# Patient Record
Sex: FEMALE | Employment: OTHER | URBAN - NONMETROPOLITAN AREA
[De-identification: names, ages, dates, MRNs, and addresses within clinical notes are randomized per-mention and may not be internally consistent; named-entity substitution may affect disease eponyms.]

---

## 2018-08-30 ENCOUNTER — OFFICE VISIT (OUTPATIENT)
Dept: NEUROLOGY | Age: 78
End: 2018-08-30
Payer: MEDICARE

## 2018-08-30 ENCOUNTER — HOSPITAL ENCOUNTER (OUTPATIENT)
Dept: LAB | Age: 78
Setting detail: SPECIMEN
Discharge: HOME | End: 2018-08-30
Payer: MEDICARE

## 2018-08-30 VITALS
SYSTOLIC BLOOD PRESSURE: 124 MMHG | WEIGHT: 163.4 LBS | HEIGHT: 68 IN | HEART RATE: 73 BPM | OXYGEN SATURATION: 97 % | BODY MASS INDEX: 24.77 KG/M2 | DIASTOLIC BLOOD PRESSURE: 62 MMHG

## 2018-08-30 DIAGNOSIS — F41.9 ANXIETY AND DEPRESSION: ICD-10-CM

## 2018-08-30 DIAGNOSIS — Z91.81 H/O FALLING: ICD-10-CM

## 2018-08-30 DIAGNOSIS — S09.90XS MILD CLOSED HEAD INJURY, SEQUELA: ICD-10-CM

## 2018-08-30 DIAGNOSIS — M19.90 ARTHRITIS: ICD-10-CM

## 2018-08-30 DIAGNOSIS — E03.9 HYPOTHYROIDISM, UNSPECIFIED TYPE: ICD-10-CM

## 2018-08-30 DIAGNOSIS — I67.82 CHRONIC CEREBRAL ISCHEMIA: ICD-10-CM

## 2018-08-30 DIAGNOSIS — F32.A ANXIETY AND DEPRESSION: ICD-10-CM

## 2018-08-30 DIAGNOSIS — Z82.49 FAMILY HISTORY OF HEART DISEASE: ICD-10-CM

## 2018-08-30 DIAGNOSIS — G63 POLYNEUROPATHY ASSOCIATED WITH UNDERLYING DISEASE (HCC): ICD-10-CM

## 2018-08-30 DIAGNOSIS — G30.1 LATE ONSET ALZHEIMER'S DISEASE WITHOUT BEHAVIORAL DISTURBANCE (HCC): Primary | ICD-10-CM

## 2018-08-30 DIAGNOSIS — G30.1 LATE ONSET ALZHEIMER'S DISEASE WITHOUT BEHAVIORAL DISTURBANCE (HCC): ICD-10-CM

## 2018-08-30 DIAGNOSIS — R41.0 CONFUSION: ICD-10-CM

## 2018-08-30 DIAGNOSIS — E78.2 MIXED HYPERLIPIDEMIA: ICD-10-CM

## 2018-08-30 DIAGNOSIS — E08.00 DIABETES MELLITUS DUE TO UNDERLYING CONDITION WITH HYPEROSMOLARITY WITHOUT COMA, WITHOUT LONG-TERM CURRENT USE OF INSULIN (HCC): ICD-10-CM

## 2018-08-30 DIAGNOSIS — I10 ESSENTIAL HYPERTENSION: ICD-10-CM

## 2018-08-30 DIAGNOSIS — M05.731 RHEUMATOID ARTHRITIS INVOLVING BOTH WRISTS WITH POSITIVE RHEUMATOID FACTOR (HCC): ICD-10-CM

## 2018-08-30 DIAGNOSIS — I34.1 MITRAL VALVE PROLAPSE: ICD-10-CM

## 2018-08-30 DIAGNOSIS — M05.732 RHEUMATOID ARTHRITIS INVOLVING BOTH WRISTS WITH POSITIVE RHEUMATOID FACTOR (HCC): ICD-10-CM

## 2018-08-30 DIAGNOSIS — Z83.2 FAMILY HISTORY OF CLOTTING DISORDER: ICD-10-CM

## 2018-08-30 DIAGNOSIS — F02.80 LATE ONSET ALZHEIMER'S DISEASE WITHOUT BEHAVIORAL DISTURBANCE (HCC): ICD-10-CM

## 2018-08-30 DIAGNOSIS — F02.80 LATE ONSET ALZHEIMER'S DISEASE WITHOUT BEHAVIORAL DISTURBANCE (HCC): Primary | ICD-10-CM

## 2018-08-30 DIAGNOSIS — I63.19 CEREBRAL INFARCTION DUE TO EMBOLISM OF OTHER PRECEREBRAL ARTERY (HCC): ICD-10-CM

## 2018-08-30 DIAGNOSIS — I63.00 CEREBRAL INFARCTION DUE TO THROMBOSIS OF PRECEREBRAL ARTERY (HCC): ICD-10-CM

## 2018-08-30 PROBLEM — M06.9 RHEUMATOID ARTHRITIS (HCC): Status: ACTIVE | Noted: 2018-08-30

## 2018-08-30 PROBLEM — E11.9 DIABETES MELLITUS (HCC): Status: ACTIVE | Noted: 2018-08-30

## 2018-08-30 PROBLEM — S09.90XA MILD CLOSED HEAD INJURY: Status: ACTIVE | Noted: 2018-08-30

## 2018-08-30 LAB
FOLATE: >20 NG/ML
TSH SERPL DL<=0.05 MIU/L-ACNC: 2.62 MIU/L (ref 0.3–5)
VITAMIN B-12: 791 PG/ML (ref 232–1245)

## 2018-08-30 PROCEDURE — 82607 VITAMIN B-12: CPT

## 2018-08-30 PROCEDURE — 36415 COLL VENOUS BLD VENIPUNCTURE: CPT

## 2018-08-30 PROCEDURE — 84443 ASSAY THYROID STIM HORMONE: CPT

## 2018-08-30 PROCEDURE — 1090F PRES/ABSN URINE INCON ASSESS: CPT | Performed by: PSYCHIATRY & NEUROLOGY

## 2018-08-30 PROCEDURE — 99205 OFFICE O/P NEW HI 60 MIN: CPT | Performed by: PSYCHIATRY & NEUROLOGY

## 2018-08-30 PROCEDURE — G8420 CALC BMI NORM PARAMETERS: HCPCS | Performed by: PSYCHIATRY & NEUROLOGY

## 2018-08-30 PROCEDURE — G8427 DOCREV CUR MEDS BY ELIG CLIN: HCPCS | Performed by: PSYCHIATRY & NEUROLOGY

## 2018-08-30 PROCEDURE — 82746 ASSAY OF FOLIC ACID SERUM: CPT

## 2018-08-30 PROCEDURE — 1101F PT FALLS ASSESS-DOCD LE1/YR: CPT | Performed by: PSYCHIATRY & NEUROLOGY

## 2018-08-30 RX ORDER — CALCIUM POLYCARBOPHIL 625 MG 625 MG/1
625 TABLET ORAL DAILY
COMMUNITY

## 2018-08-30 RX ORDER — CHOLECALCIFEROL (VITAMIN D3) 25 MCG
CAPSULE ORAL DAILY
COMMUNITY

## 2018-08-30 RX ORDER — ESCITALOPRAM OXALATE 10 MG/1
10 TABLET ORAL DAILY
COMMUNITY

## 2018-08-30 RX ORDER — SULFAMETHOXAZOLE AND TRIMETHOPRIM 800; 160 MG/1; MG/1
1 TABLET ORAL PRN
COMMUNITY

## 2018-08-30 RX ORDER — LANOLIN ALCOHOL/MO/W.PET/CERES
1000 CREAM (GRAM) TOPICAL DAILY
COMMUNITY

## 2018-08-30 RX ORDER — SIMVASTATIN 40 MG
40 TABLET ORAL EVERY MORNING
COMMUNITY

## 2018-08-30 RX ORDER — MEMANTINE HYDROCHLORIDE 10 MG/1
10 TABLET ORAL 2 TIMES DAILY
COMMUNITY
End: 2021-01-07 | Stop reason: SDUPTHER

## 2018-08-30 RX ORDER — OMEPRAZOLE 20 MG/1
20 CAPSULE, DELAYED RELEASE ORAL 2 TIMES DAILY
COMMUNITY

## 2018-08-30 ASSESSMENT — ENCOUNTER SYMPTOMS
BACK PAIN: 0
NAUSEA: 0
VOMITING: 0
APNEA: 0
SORE THROAT: 0
EYE ITCHING: 0
SHORTNESS OF BREATH: 0
EYE PAIN: 0
COLOR CHANGE: 0
SINUS PRESSURE: 0
BLOOD IN STOOL: 0
CHEST TIGHTNESS: 0
PHOTOPHOBIA: 0
TROUBLE SWALLOWING: 0
DIARRHEA: 0
COUGH: 0
ABDOMINAL PAIN: 0
EYE REDNESS: 0
BOWEL INCONTINENCE: 0
WHEEZING: 0
CHOKING: 0
VISUAL CHANGE: 0
FACIAL SWELLING: 0
ABDOMINAL DISTENTION: 0
VOICE CHANGE: 0
EYE DISCHARGE: 0
CONSTIPATION: 0

## 2018-08-30 NOTE — PROGRESS NOTES
Carotid bruit is not present. No neck rigidity. No tracheal deviation and normal range of motion present. No Brudzinski's sign and no Kernig's sign noted. No thyroid mass and no thyromegaly present. Cardiovascular: Normal rate and regular rhythm. Pulmonary/Chest: Effort normal.   Musculoskeletal: She exhibits no edema or tenderness. Skin: Skin is warm. No rash noted. No cyanosis or erythema. No pallor. Nails show no clubbing. Psychiatric: Her mood appears anxious. Her affect is not angry, not blunt, not labile and not inappropriate. She is slowed. She is not agitated, not aggressive, not hyperactive, not withdrawn, not actively hallucinating and not combative. Thought content is not paranoid and not delusional. Cognition and memory are impaired. She does not express impulsivity or inappropriate judgment. She exhibits a depressed mood. She expresses no homicidal and no suicidal ideation. She expresses no suicidal plans and no homicidal plans. She exhibits abnormal recent memory. She exhibits normal remote memory. She is attentive. Neurologic Exam    NEUROLOGICAL EXAMINATION :      A) MENTAL STATUS:                   Alert and  oriented  To time, place  And  Person. No Aphasia. No  Dysarthria. Able   To  Follow three  Step commands without   Any  Difficulty. No right  To left confusion. Normal  Speech  And language function. Insight and  Judgment ,Fund  Of  Knowledge   DECREASED              Recent  And  Remote memory  DECREASED              Attention &Concentration are   DECREASED                                                B) CRANIAL NERVES :             2 CN : Visual  Acuity  And  Visual fields  within normal limits                      Fundi  Could  Not  Be  Could  Not  Be  Evaluated. 3,4,6 CN : Both  Pupils are   Reactive and  Equal.                          Extraocular   Movements  Are  Intact. No  Nystagmus. No  ANTON. No  Afferent  Pupillary  Defect noted. 5 CN :  Normal  Facial sensations and Corneal  Reflexes         7 CN :  Normal  Facial  Symmetry  And  Strength. No facial  Weakness. 8 CN :  Hearing  Appears   DECREASED        9, 10 CN: Normal spontaneous, reflex palate movements       11 CN:   Normal  Shoulder shrug and  strength       12 CN :   Normal  Tongue movements and  Tongue  In midline                      No tongue   Fasciculations or atrophy     C) MOTOR  EXAM:                 Strength  In upper  And  Lower extremities   within normal limits,                           EXCEPT    DECREASED   HAND               No  Drift. No  Atrophy             Rapid alternating  And  repetitions  Movements  within normal limits               Muscle  Tone  In upper  And  Lower  Extremities  normal              No rigidity. No  Spasticity. Bradykinesia   absent               No  Asterixis. Sustention  Tremor , Resting  Tremor   absent                  No other  Abnormal  Movements noted         D) SENSORY :             light touch, pinprick, position  And  Vibration  DECREASED    E) REFLEXES:                 Deep  Tendon  Reflexes   DECREASED                  No pathological  Reflexes  Bilaterally.                                 F) COORDINATION  AND  GAIT :                              Station and  Gait   SLOW                                      Romberg's test   POSITIVE                        Ataxia negative    ASSESSMENT:    Patient Active Problem List   Diagnosis    Rheumatoid arthritis ([facility] [address]    Mixed hyperlipidemia    Mitral valve prolapse    Hypothyroidism    Family history of heart disease    Family history of clotting disorder    Essential hypertension    Diabetes mellitus ([address]    Arthritis    Late onset Alzheimer's disease without behavioral disturbance    Anxiety and depression    H/O falling    Confusion    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 Alignment: The cervical vertebral bodies are in anatomic alignment. Prevertebral soft tissues: normal.     Bones: Odontoid is well seen and unremarkable. There is no evidence of fracture. Surrounding soft tissues: Normal with no evidence of mass, adenopathy, abnormal fluid collection    Lung apices: Clear. Visualized skull base: Right mastoidectomy changes and small right mastoid effusion. IMPRESSION:    1.  Moderate multilevel spondylosis and degenerative facet arthropathy throughout with disc height loss most pronounced at C5-6 and C6-7.  2.  Right mastoidectomy changes and small right mastoid effusion. WSN:[id number]      Electronically Apurva Temple MD  Signed Date/Time:  5/5/2018 8:08:00 PM      VISITING DIAGNOSIS:      ICD-10-CM ICD-9-CM    1. Late onset Alzheimer's disease without behavioral disturbance G30.1 331.0 EEG    F02.80 294.10 VL DUP CAROTID BILATERAL      US Transcranial Doppler Complete      TSH without Reflex      Vitamin B12 & Folate   2. 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 Mixed hyperlipidemia E78.2 272.2 EEG      VL DUP CAROTID BILATERAL      US Transcranial Doppler Complete      TSH without Reflex      Vitamin B12 & Folate   4. Mitral valve prolapse I34.1 424.0 EEG      VL DUP CAROTID BILATERAL      US Transcranial Doppler Complete      TSH without Reflex      Vitamin B12 & Folate   5. Hypothyroidism, unspecified type E03.9 244.9 EEG      VL DUP CAROTID BILATERAL      US Transcranial Doppler Complete      TSH without Reflex      Vitamin B12 & Folate   6.  Family history of heart disease Z82.49 V17.49 EEG      VL DUP CAROTID BILATERAL      US Transcranial Doppler Complete      TSH without Reflex      Vitamin B12 & Folate   7. Family history of clotting disorder Z83.2 V18.3 EEG      VL DUP CAROTID BILATERAL      US Transcranial Doppler Complete      TSH without Reflex      Vitamin B12 & Folate   8. Essential hypertension I10 401.9 EEG      VL DUP CAROTID BILATERAL      US Transcranial Doppler Complete      TSH without Reflex      Vitamin B12 & Folate   9. Diabetes mellitus due to underlying condition with hyperosmolarity without coma, without long-term current use of insulin ([facility]) E08.00 249.20 EEG      VL DUP CAROTID BILATERAL      US Transcranial Doppler Complete      TSH without Reflex      Vitamin B12 & Folate   10. Arthritis M19.90 716.90 EEG      VL DUP CAROTID BILATERAL      US Transcranial Doppler Complete      TSH without Reflex      Vitamin B12 & Folate   11. Anxiety and depression F41.9 300.00 EEG    F32.9 311 VL DUP CAROTID BILATERAL      US Transcranial Doppler Complete      TSH without Reflex      Vitamin B12 & Folate   12. H/O falling Z91.81 V15.88 EEG      VL DUP CAROTID BILATERAL      US Transcranial Doppler Complete      TSH without Reflex      Vitamin B12 & Folate   13. Confusion R41.0 298.9 EEG      VL DUP CAROTID BILATERAL      US Transcranial Doppler Complete      TSH without Reflex      Vitamin B12 & Folate   14. Polyneuropathy associated with underlying disease ([facility] [address] G63 357.4 EEG      VL DUP CAROTID BILATERAL      US Transcranial Doppler Complete      TSH without Reflex      Vitamin B12 & Folate   15. Mild closed head injury, sequela S09.90XS 908.9 EEG      VL DUP CAROTID BILATERAL      US Transcranial Doppler Complete      TSH without Reflex      Vitamin B12 & Folate   16. 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  Chronic cerebral ischemia I67.82 437.1 CONCERNS   &   INCREASED   RISK   FOR         * TIA,  CEREBRO  VASCULAR  ISCHEMIA, STROKE      *   COGNITIVE  &   MEMORY PROBLEMS  AND  DEMENTIAS         *   PERIPHERAL  NEUROPATHY,   NEUROPATHIC  PAIN         *  GAIT  DIFFICULTIES  &   BALANCE PROBLMES   AND  FALL            VARIOUS  RISK   FACTORS   WERE  REVIEWED   AND   DISCUSSED. *  PATIENT   HAS  MULTIPLE   MEDICAL, MENTAL HEALTH            NEUROLOGICAL   PROBLEMS . PATIENT'S   MANAGEMENT  IS  CHALLENGING. PLAN:       [place]  Of  The  Diagnoses,  The  Management & Treatment  Options           AND    Care  plan  Were        Reviewed and   Discussed   With  patient. * Goals  And  Expectations  Of  The  Therapy  Discussed   And  Reviewed. *   Benefits   And   Side  Effect  Profile  Of  Medication/s   Were   Discussed             * Need   For  Further   Follow up For  The  Various  Problems  Were discussed. * Results  Of  The  Previous  Diagnostic tests were reviewed and questions answered. patient  understand the same. Medical  Decision  Making  Was  Made  Based on the   Complexity  Of  Above  Mentioned  Diagnoses,    Data reviewed   & diagnostic  Tests  Reviewed,  Risk  Of  Significant   Co morbidities and complicating   Factors. Medical  Decision  Was   High  Complexity  Due   To  The  Patient's  Multiple  Symptoms,  Advancing   Disease,  Complex  Treatment  Regimen,  Multiple medications and   Risk  Of   Side  Effects,  Difficulty  In  Medication  Management  And  Diagnostic  Challenges   In  View  Of  The  Associated   Co  Morbid  Conditions   And  Problems. * FALL   PRECAUTIONS. THESE  REVIEWED   AND  DISCUSSED    *  USE   WALKING  ASSISTANCE  DEVICES     QUAD  CANE       *   BE  CAREFUL  WITH  ACTIVITIES   INCLUDING  DRIVING.       *   ADEQUATE   FLUID  INTAKE   AND  ELECTROLYTE  BALANCE         * KEEP  DAIRY  OF   THE  NEUROLOGICAL  SYMPTOMS fitness, but there are many ways you and your family can be more active. Anything that makes you breathe hard and gets your heart pumping is exercise. Here are some tips:  Walk to do errands or to take your child to school or the bus. Go for a family bike ride after dinner instead of watching TV. Where can you learn more? Go to https://Encoverpepiceweb.Pryv. org and sign in to your Imagekind account. Enter B589 in the [employer] box to learn more about \"A Healthy Lifestyle: Care Instructions. \"     If you do not have an account, please click on the \"Sign Up Now\" link. Current as of: July 26, 2016  Content Version: 11.2  © 5568-3132 [employer]. Care instructions adapted under license by [facility] [place] ([facility]). If you have questions about a medical condition or this instruction, always ask your healthcare professional. [address] any warranty or liability for your use of this information.

## 2018-08-30 NOTE — PATIENT INSTRUCTIONS
computer records the activity of the brain. This looks like wavy lines on the computer screen or on paper. Why is this test done? The test is often used to diagnose epilepsy. It helps a doctor know what types of seizures are happening. An EEG can also check brain activity in people with sleep disorders. It can also help a doctor know why a person passed out (lost consciousness). How can you prepare for the test?  · Tell your doctor if you are taking any medicines. Your doctor may ask you to stop taking certain medicines before the test. These include sedatives and tranquilizers, muscle relaxants, sleeping aids, and seizure medicines. · Do not eat or drink anything with caffeine in it for 12 hours before the test. This includes cola, energy drinks, and chocolate. · Shampoo your hair and rinse with clear water the evening before or the morning of the test. Do not put any hair conditioner or oil on after you wash your hair. · Your doctor may ask you not to sleep the night before the test or to sleep for only about 4 or 5 hours. This is because some types of brain activity can only be seen if you are asleep. If your doctor asks you to get less sleep than normal, plan to have someone drive you to and from the test.  What happens during the test?  · You will lie on your back on a bed or table. Or you might relax in a chair with your eyes closed. · A technologist will attach the electrodes to different places on your head. Or you might get a cap with fixed electrodes on it. · You will lie still with your eyes closed. The technologist will tell you not to talk unless you need to. · The technologist may ask you to:  ¨ Breathe deeply and rapidly. This is called hyperventilating. ¨ Look at a bright, flashing light called a strobe. ¨ Go to sleep. If you can't fall asleep, you may get medicine to help you. What else should you know about the test?  · There is no pain.  No electrical current goes through your body.  · If you have a seizure disorder such as epilepsy, the flashing lights or hyperventilation may cause a seizure. The technologist is trained to take care of you if this happens. · If electrodes are put in your nose, they may tickle. In rare cases, they can also cause some soreness or a small amount of bleeding for 1 to 2 days after the test.  How long does the test take? · The test will take about 1 to 2 hours. What happens after the test?  · You will probably be able to go home right away. But if you didn't sleep your normal amount before the test, have someone drive you home. · You can go back to your usual activities right away. When should you call for help? Watch closely for changes in your health, and be sure to contact your doctor if:  · You have any problems that you think may be from the test.  · You have any questions about the test or have not received your results. Follow-up care is a key part of your treatment and safety. Be sure to make and go to all appointments, and call your doctor if you are having problems. It's also a good idea to keep a list of the medicines you take. Ask your doctor when you can expect to have your test results. Where can you learn more? Go to https://[employer]plex Solutions.YouView. org and sign in to your AdverCar account. Enter F196 in the BlogHer box to learn more about \"Electroencephalogram (EEG): About This Test.\"     If you do not have an account, please click on the \"Sign Up Now\" link. Current as of: October 9, 2017  Content Version: 11.7  © 0570-3159 [employer]. Care instructions adapted under license by [facility] [facility] ([facility]). If you have questions about a medical condition or this instruction, always ask your healthcare professional. [place] [address] any warranty or liability for your use of this information.

## 2018-09-07 ENCOUNTER — HOSPITAL ENCOUNTER (OUTPATIENT)
Dept: INTERVENTIONAL RADIOLOGY/VASCULAR | Age: 78
Discharge: HOME | End: 2018-09-09
Payer: MEDICARE

## 2018-09-07 DIAGNOSIS — Z83.2 FAMILY HISTORY OF CLOTTING DISORDER: ICD-10-CM

## 2018-09-07 DIAGNOSIS — E03.9 HYPOTHYROIDISM, UNSPECIFIED TYPE: ICD-10-CM

## 2018-09-07 DIAGNOSIS — M19.90 ARTHRITIS: ICD-10-CM

## 2018-09-07 DIAGNOSIS — Z82.49 FAMILY HISTORY OF HEART DISEASE: ICD-10-CM

## 2018-09-07 DIAGNOSIS — I34.1 MITRAL VALVE PROLAPSE: ICD-10-CM

## 2018-09-07 DIAGNOSIS — G63 POLYNEUROPATHY ASSOCIATED WITH UNDERLYING DISEASE (HCC): ICD-10-CM

## 2018-09-07 DIAGNOSIS — M05.732 RHEUMATOID ARTHRITIS INVOLVING BOTH WRISTS WITH POSITIVE RHEUMATOID FACTOR (HCC): ICD-10-CM

## 2018-09-07 DIAGNOSIS — F32.A ANXIETY AND DEPRESSION: ICD-10-CM

## 2018-09-07 DIAGNOSIS — Z91.81 H/O FALLING: ICD-10-CM

## 2018-09-07 DIAGNOSIS — E78.2 MIXED HYPERLIPIDEMIA: ICD-10-CM

## 2018-09-07 DIAGNOSIS — R41.0 CONFUSION: ICD-10-CM

## 2018-09-07 DIAGNOSIS — F02.80 LATE ONSET ALZHEIMER'S DISEASE WITHOUT BEHAVIORAL DISTURBANCE (HCC): ICD-10-CM

## 2018-09-07 DIAGNOSIS — F41.9 ANXIETY AND DEPRESSION: ICD-10-CM

## 2018-09-07 DIAGNOSIS — M05.731 RHEUMATOID ARTHRITIS INVOLVING BOTH WRISTS WITH POSITIVE RHEUMATOID FACTOR (HCC): ICD-10-CM

## 2018-09-07 DIAGNOSIS — S09.90XS MILD CLOSED HEAD INJURY, SEQUELA: ICD-10-CM

## 2018-09-07 DIAGNOSIS — G30.1 LATE ONSET ALZHEIMER'S DISEASE WITHOUT BEHAVIORAL DISTURBANCE (HCC): ICD-10-CM

## 2018-09-07 DIAGNOSIS — E08.00 DIABETES MELLITUS DUE TO UNDERLYING CONDITION WITH HYPEROSMOLARITY WITHOUT COMA, WITHOUT LONG-TERM CURRENT USE OF INSULIN (HCC): ICD-10-CM

## 2018-09-07 DIAGNOSIS — I10 ESSENTIAL HYPERTENSION: ICD-10-CM

## 2018-09-07 DIAGNOSIS — I63.19 CEREBRAL INFARCTION DUE TO EMBOLISM OF OTHER PRECEREBRAL ARTERY (HCC): ICD-10-CM

## 2018-09-07 PROCEDURE — 93880 EXTRACRANIAL BILAT STUDY: CPT

## 2018-09-25 ENCOUNTER — HOSPITAL ENCOUNTER (OUTPATIENT)
Dept: NEUROLOGY | Age: 78
Discharge: HOME | End: 2018-09-25
Payer: MEDICARE

## 2018-09-25 DIAGNOSIS — G30.1 LATE ONSET ALZHEIMER'S DISEASE WITHOUT BEHAVIORAL DISTURBANCE (HCC): ICD-10-CM

## 2018-09-25 DIAGNOSIS — Z82.49 FAMILY HISTORY OF HEART DISEASE: ICD-10-CM

## 2018-09-25 DIAGNOSIS — M05.732 RHEUMATOID ARTHRITIS INVOLVING BOTH WRISTS WITH POSITIVE RHEUMATOID FACTOR (HCC): ICD-10-CM

## 2018-09-25 DIAGNOSIS — M05.731 RHEUMATOID ARTHRITIS INVOLVING BOTH WRISTS WITH POSITIVE RHEUMATOID FACTOR (HCC): ICD-10-CM

## 2018-09-25 DIAGNOSIS — E78.2 MIXED HYPERLIPIDEMIA: ICD-10-CM

## 2018-09-25 DIAGNOSIS — G63 POLYNEUROPATHY ASSOCIATED WITH UNDERLYING DISEASE (HCC): ICD-10-CM

## 2018-09-25 DIAGNOSIS — E08.00 DIABETES MELLITUS DUE TO UNDERLYING CONDITION WITH HYPEROSMOLARITY WITHOUT COMA, WITHOUT LONG-TERM CURRENT USE OF INSULIN (HCC): ICD-10-CM

## 2018-09-25 DIAGNOSIS — F41.9 ANXIETY AND DEPRESSION: ICD-10-CM

## 2018-09-25 DIAGNOSIS — E03.9 HYPOTHYROIDISM, UNSPECIFIED TYPE: ICD-10-CM

## 2018-09-25 DIAGNOSIS — I10 ESSENTIAL HYPERTENSION: ICD-10-CM

## 2018-09-25 DIAGNOSIS — I34.1 MITRAL VALVE PROLAPSE: ICD-10-CM

## 2018-09-25 DIAGNOSIS — S09.90XS MILD CLOSED HEAD INJURY, SEQUELA: ICD-10-CM

## 2018-09-25 DIAGNOSIS — F02.80 LATE ONSET ALZHEIMER'S DISEASE WITHOUT BEHAVIORAL DISTURBANCE (HCC): ICD-10-CM

## 2018-09-25 DIAGNOSIS — R41.0 CONFUSION: ICD-10-CM

## 2018-09-25 DIAGNOSIS — Z83.2 FAMILY HISTORY OF CLOTTING DISORDER: ICD-10-CM

## 2018-09-25 DIAGNOSIS — M19.90 ARTHRITIS: ICD-10-CM

## 2018-09-25 DIAGNOSIS — Z91.81 H/O FALLING: ICD-10-CM

## 2018-09-25 DIAGNOSIS — F32.A ANXIETY AND DEPRESSION: ICD-10-CM

## 2018-09-25 DIAGNOSIS — I63.00 CEREBRAL INFARCTION DUE TO THROMBOSIS OF PRECEREBRAL ARTERY (HCC): ICD-10-CM

## 2018-09-25 PROCEDURE — 95957 EEG DIGITAL ANALYSIS: CPT

## 2018-09-25 PROCEDURE — 93886 INTRACRANIAL COMPLETE STUDY: CPT

## 2018-09-25 PROCEDURE — 93892 TCD EMBOLI DETECT W/O INJ: CPT

## 2018-09-25 PROCEDURE — 95813 EEG EXTND MNTR 61-119 MIN: CPT

## 2018-09-25 NOTE — PROGRESS NOTES
EXTENDED EEG Completed with [facility]. PCP: Ludivina Chun DO    Ordering: Lukas Strickland.  Frida Neurologist    Interpreting Physician: Jamie Jones Neurologist    Technician: Abner Bergman RN

## 2018-09-25 NOTE — PROGRESS NOTES
TCD Completed with [facility]. PCP: Ramo Houston DO    Ordering: Bam Benitez. Frida Neurologist    Interpreting Physician: Bam Benitez.  Regan Galicia    Electronically signed by Delma Mata RN on 9/25/2018 at 1:17 PM

## 2018-10-16 ENCOUNTER — OFFICE VISIT (OUTPATIENT)
Dept: NEUROLOGY | Age: 78
End: 2018-10-16
Payer: MEDICARE

## 2018-10-16 VITALS
HEIGHT: 68 IN | BODY MASS INDEX: 24.76 KG/M2 | SYSTOLIC BLOOD PRESSURE: 124 MMHG | HEART RATE: 76 BPM | OXYGEN SATURATION: 93 % | WEIGHT: 163.36 LBS | DIASTOLIC BLOOD PRESSURE: 78 MMHG

## 2018-10-16 DIAGNOSIS — S09.90XS MILD CLOSED HEAD INJURY, SEQUELA: ICD-10-CM

## 2018-10-16 DIAGNOSIS — Z91.81 H/O FALLING: ICD-10-CM

## 2018-10-16 DIAGNOSIS — G30.1 LATE ONSET ALZHEIMER'S DISEASE WITHOUT BEHAVIORAL DISTURBANCE (HCC): Primary | ICD-10-CM

## 2018-10-16 DIAGNOSIS — E08.00 DIABETES MELLITUS DUE TO UNDERLYING CONDITION WITH HYPEROSMOLARITY WITHOUT COMA, WITHOUT LONG-TERM CURRENT USE OF INSULIN (HCC): ICD-10-CM

## 2018-10-16 DIAGNOSIS — F32.A ANXIETY AND DEPRESSION: ICD-10-CM

## 2018-10-16 DIAGNOSIS — E03.9 HYPOTHYROIDISM, UNSPECIFIED TYPE: ICD-10-CM

## 2018-10-16 DIAGNOSIS — G63 POLYNEUROPATHY ASSOCIATED WITH UNDERLYING DISEASE (HCC): ICD-10-CM

## 2018-10-16 DIAGNOSIS — F02.80 LATE ONSET ALZHEIMER'S DISEASE WITHOUT BEHAVIORAL DISTURBANCE (HCC): Primary | ICD-10-CM

## 2018-10-16 DIAGNOSIS — I63.19 CEREBRAL INFARCTION DUE TO EMBOLISM OF OTHER PRECEREBRAL ARTERY (HCC): ICD-10-CM

## 2018-10-16 DIAGNOSIS — I10 ESSENTIAL HYPERTENSION: ICD-10-CM

## 2018-10-16 DIAGNOSIS — I67.82 CHRONIC CEREBRAL ISCHEMIA: ICD-10-CM

## 2018-10-16 DIAGNOSIS — I34.1 MITRAL VALVE PROLAPSE: ICD-10-CM

## 2018-10-16 DIAGNOSIS — F41.9 ANXIETY AND DEPRESSION: ICD-10-CM

## 2018-10-16 DIAGNOSIS — Z82.49 FAMILY HISTORY OF HEART DISEASE: ICD-10-CM

## 2018-10-16 DIAGNOSIS — E78.2 MIXED HYPERLIPIDEMIA: ICD-10-CM

## 2018-10-16 PROCEDURE — 1123F ACP DISCUSS/DSCN MKR DOCD: CPT | Performed by: PSYCHIATRY & NEUROLOGY

## 2018-10-16 PROCEDURE — 99214 OFFICE O/P EST MOD 30 MIN: CPT | Performed by: PSYCHIATRY & NEUROLOGY

## 2018-10-16 PROCEDURE — G8420 CALC BMI NORM PARAMETERS: HCPCS | Performed by: PSYCHIATRY & NEUROLOGY

## 2018-10-16 PROCEDURE — 1101F PT FALLS ASSESS-DOCD LE1/YR: CPT | Performed by: PSYCHIATRY & NEUROLOGY

## 2018-10-16 PROCEDURE — G8427 DOCREV CUR MEDS BY ELIG CLIN: HCPCS | Performed by: PSYCHIATRY & NEUROLOGY

## 2018-10-16 PROCEDURE — G8484 FLU IMMUNIZE NO ADMIN: HCPCS | Performed by: PSYCHIATRY & NEUROLOGY

## 2018-10-16 PROCEDURE — 4040F PNEUMOC VAC/ADMIN/RCVD: CPT | Performed by: PSYCHIATRY & NEUROLOGY

## 2018-10-16 PROCEDURE — 1090F PRES/ABSN URINE INCON ASSESS: CPT | Performed by: PSYCHIATRY & NEUROLOGY

## 2018-10-16 PROCEDURE — 1036F TOBACCO NON-USER: CPT | Performed by: PSYCHIATRY & NEUROLOGY

## 2018-10-16 PROCEDURE — G8598 ASA/ANTIPLAT THER USED: HCPCS | Performed by: PSYCHIATRY & NEUROLOGY

## 2018-10-16 PROCEDURE — G8400 PT W/DXA NO RESULTS DOC: HCPCS | Performed by: PSYCHIATRY & NEUROLOGY

## 2018-10-16 ASSESSMENT — ENCOUNTER SYMPTOMS
EYE DISCHARGE: 0
DIARRHEA: 0
WHEEZING: 0
SORE THROAT: 0
CONSTIPATION: 0
EYE REDNESS: 0
NAUSEA: 0
COLOR CHANGE: 0
ABDOMINAL PAIN: 0
VISUAL CHANGE: 0
ABDOMINAL DISTENTION: 0
BACK PAIN: 0
PHOTOPHOBIA: 0
VOICE CHANGE: 0
BLOOD IN STOOL: 0
BOWEL INCONTINENCE: 0
EYE PAIN: 0
CHEST TIGHTNESS: 0
FACIAL SWELLING: 0
SHORTNESS OF BREATH: 0
COUGH: 0
SINUS PRESSURE: 0
EYE ITCHING: 0
TROUBLE SWALLOWING: 0
VOMITING: 0
CHOKING: 0
APNEA: 0

## 2018-10-16 NOTE — PROGRESS NOTES
AT   [address]   3  YEARS  IN  THE PAST                       4)    ON   NAMENDA      FOR   MORE   THAN     5  YEARS                     5)    H/O   BAD  DREAMS                                       ON   ARICEPT    IN   THE  PAST                                                        CURRENTLY  ON    ARICEPT  AND  NAMENDA                                         AND  TOLERATING  THE  SAME                                     AS  PER  HER                        6)   H/O     COGNITIVE  AND  MEMORY  PROBLEMS    GETTING                                 WORSE    SINCE     2017  SLOWLY                        7)     H/O    CHRONIC     BALANCE  AND  GAIT    PROBLEMS                                        -    STABLE                        8)    H/O      FALL    AND  MILD   HEAD  INJURY      IN   MAY   2018                           PATIENT  HAD  EVALUATIONS  AT  THAT  TIME. 9)    H/O   PULMONARY   EMBOLISM     MORE  THAN    5   YEARS   AGO                                 ON    XARELTO                     10)    H/O   ANXIETY,   DEPRESSION                                  -     STABLE                            ON    LEXAPRO                        11)     DIABETIC  PERIPHERAL  NEUROPATHY                                      -    STABLE                      12)    MULTIPLE  CO  MORBID   MEDICAL   CONDITIONS                               BEING  FOLLOWED     BY     HER  PCP.                                                    13)    PATIENT    HAD NEURO  DIAGNOSTIC  EVALUATIONS                                    IN   SEPT. 2018   WITH    CAROTID,   EEG      WHICH                                     DID NOT  SHOW  SIGNIFICANT  ABNORMALITIES                                TCD   ABNORMAL    AND  PATIENT  ALREADY  ON   XARELTO                                         FOR  CARDIOPULMONARY      REASONS.                            14)    PATIENT  AND  HER   DENIES  ANY  NEW  NEUROLOGICAL                                    CONCERNS.                                             PRECIPITATING  FACTORS: including  fever/infection, exertion/relaxation, position change, stress, weather change, medications/alcohol, time of day/darkness/light  Are    absent                                                             MODIFYING  FACTORS:  fever/infection, exertion/relaxation, position change, stress, weather change, medications/alcohol, time of day/darkness/light     Are  absent         Patient   Indicates   The  Presence   And  The  Absence  Of  The  Following  Associated  And   Additional  Neurological    Symptoms:                                Balance  And coordination problems  present           Gait problems     present            Headaches      absent              Migraines           absent           Memory problems        present           Confusion        present            Paresthesia numbness          present           Seizures  And  Starring  Episodes           absent           Syncope,  Near  syncopal episodes         absent           Speech problems           absent             Swallowing  Problems      absent            Dizziness,  Light headedness           absent                        Vertigo        absent             Generalized   Weakness    absent              focal  Weakness     present             Tremors         absent              Sleep  Problems     absent             History  Of   Recent   Head  Injury     absent             History  Of   Recent  TIA     absent             History  Of   Recent    Stroke     absent             Neck  Pain and  Neck muscle  Spasms  absent             Radiating  down   And   Weakness           absent            Lower back   Pain  And     Spasms  absent            Radiating    Down   And   Weakness          absent                H/O   FALLS        present               History  Of   Visual  Symptoms    absent Cholecalciferol (VITAMIN D-3) 1000 units CAPS Take by mouth daily      polycarbophil (FIBERCON) 625 MG tablet Take 625 mg by mouth daily      metFORMIN (GLUCOPHAGE) 500 MG tablet Take 500 mg by mouth 2 times daily (with meals)      Multiple Vitamins-Minerals (MULTIVITAMIN ADULT PO) Take by mouth      DiphenhydrAMINE HCl (BENADRYL ALLERGY PO) Take by mouth as needed      CIPROFLOXACIN PO Take 500 mg by mouth as needed      sulfamethoxazole-trimethoprim (BACTRIM DS;SEPTRA DS) 800-160 MG per tablet Take 1 tablet by mouth as needed       No current facility-administered medications for this visit. Allergies   Allergen Reactions    Codeine Nausea Only    Coumadin [Warfarin] Other (See Comments)     Could not stabalize    Fosamax [Alendronate] Nausea Only    Morphine Sulfate [Morphine] Nausea Only         Family History   Problem Relation Age of Onset    Diabetes Mother     Heart Disease Mother     Heart Disease Father     Cancer Sister     Cancer Brother          Social History     Social History    Marital status:      Spouse name: N/A    Number of children: N/A    Years of education: N/A     Occupational History    Not on file.      Social History Main Topics    Smoking status: Former Smoker     Quit date: 1/17/1994    Smokeless tobacco: Never Used    Alcohol use 0.6 oz/week     1 Glasses of wine per week      Comment: 1 glass occasionally    Drug use: No    Sexual activity: Yes     Partners: Male     Other Topics Concern    Not on file     Social History Narrative    No narrative on file       Vitals:    10/16/18 1426   BP: 124/78   Pulse: 76   SpO2: 93%         Wt Readings from Last 3 Encounters:   10/16/18 163 lb 5.8 oz (74.1 kg)   08/30/18 163 lb 6.4 oz (74.1 kg)         BP Readings from Last 3 Encounters:   10/16/18 124/78   08/30/18 124/62         Review of Systems   Constitutional: Negative for appetite change, chills, diaphoresis, fatigue, fever and unexpected weight within normal limits                      Fundi  Could  Not  Be  Could  Not  Be  Evaluated. 3,4,6 CN : Both  Pupils are   Reactive and  Equal.                          Extraocular   Movements  Are  Intact. No  Nystagmus. No  ANTON. No  Afferent  Pupillary  Defect noted. 5 CN :  Normal  Facial sensations and Corneal  Reflexes         7 CN :  Normal  Facial  Symmetry  And  Strength. No facial  Weakness. 8 CN :  Hearing  Appears   DECREASED        9, 10 CN: Normal spontaneous, reflex palate movements       11 CN:   Normal  Shoulder shrug and  strength       12 CN :   Normal  Tongue movements and  Tongue  In midline                      No tongue   Fasciculations or atrophy     C) MOTOR  EXAM:                 Strength  In upper  And  Lower extremities   within normal limits,                           EXCEPT    DECREASED   HAND               No  Drift. No  Atrophy             Rapid alternating  And  repetitions  Movements  within normal limits               Muscle  Tone  In upper  And  Lower  Extremities  normal              No rigidity. No  Spasticity. Bradykinesia   absent               No  Asterixis. Sustention  Tremor , Resting  Tremor   absent                  No other  Abnormal  Movements noted         D) SENSORY :             light touch, pinprick, position  And  Vibration  DECREASED    E) REFLEXES:                 Deep  Tendon  Reflexes   DECREASED                  No pathological  Reflexes  Bilaterally.                                 F) COORDINATION  AND  GAIT :                              Station and  Gait   SLOW                                      Romberg's test   POSITIVE                        Ataxia negative    ASSESSMENT:    Patient Active Problem List   Diagnosis    Rheumatoid arthritis ([address]    Mixed hyperlipidemia    Mitral valve prolapse    Hypothyroidism    Family history of heart disease    Family history of clotting disorder    Essential hypertension    Diabetes mellitus [facility] [address]    Arthritis    Late onset Alzheimer's disease without behavioral disturbance    Anxiety and depression    H/O falling    Confusion    Polyneuropathy associated with underlying disease ([facility] [address] [facility]    Mild closed head injury    Chronic cerebral ischemia    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       HISTORY:   ORDERING SYSTEM PROVIDED HISTORY: Cerebral infarction due to embolism of   other precerebral artery (HCC)       FINDINGS:       RIGHT:       The right common carotid artery demonstrates peak systolic velocities of 56,   49 cm/sec in the proximal and distal segments respectively.       The right internal carotid artery demonstrates the systolic velocities of 46,   71, 67 cm/sec in the proximal, mid and distal segments respectively.       The external carotid artery is patent.  The vertebral artery demonstrates   normal antegrade flow.       Scattered atherosclerosis.       ICA/CCA ratio of 0.9.           LEFT:       The left common carotid artery demonstrates peak systolic velocities of 66,   59 cm/sec in the proximal and distal segments respectively.       The left internal carotid artery demonstrates the systolic velocities of 43,   87, 82 cm/sec in the proximal, mid and distal segments respectively.       The external carotid artery is patent.  The vertebral artery demonstrates   normal antegrade flow.       Scattered atherosclerosis       ICA/CCA ratio of 0.9.           Impression   The right internal carotid artery demonstrates 0-50% stenosis .       The left internal carotid artery demonstrates 0-50% stenosis .       Bilateral vertebral arteries are patent with flow in the normal direction.               [address], [place]      Ordering physician: Austyn Torres

## 2019-05-13 ENCOUNTER — OFFICE VISIT (OUTPATIENT)
Dept: NEUROLOGY | Age: 79
End: 2019-05-13
Payer: MEDICARE

## 2019-05-13 VITALS
BODY MASS INDEX: 27.82 KG/M2 | WEIGHT: 167 LBS | HEIGHT: 65 IN | DIASTOLIC BLOOD PRESSURE: 80 MMHG | SYSTOLIC BLOOD PRESSURE: 132 MMHG | HEART RATE: 76 BPM

## 2019-05-13 DIAGNOSIS — G30.1 LATE ONSET ALZHEIMER'S DISEASE WITHOUT BEHAVIORAL DISTURBANCE (HCC): Primary | ICD-10-CM

## 2019-05-13 DIAGNOSIS — R41.0 CONFUSION: ICD-10-CM

## 2019-05-13 DIAGNOSIS — G63 POLYNEUROPATHY ASSOCIATED WITH UNDERLYING DISEASE (HCC): ICD-10-CM

## 2019-05-13 DIAGNOSIS — Z82.49 FAMILY HISTORY OF HEART DISEASE: ICD-10-CM

## 2019-05-13 DIAGNOSIS — E03.9 HYPOTHYROIDISM, UNSPECIFIED TYPE: ICD-10-CM

## 2019-05-13 DIAGNOSIS — M05.731 RHEUMATOID ARTHRITIS INVOLVING BOTH WRISTS WITH POSITIVE RHEUMATOID FACTOR (HCC): ICD-10-CM

## 2019-05-13 DIAGNOSIS — S09.90XS MILD CLOSED HEAD INJURY, SEQUELA: ICD-10-CM

## 2019-05-13 DIAGNOSIS — I67.82 CHRONIC CEREBRAL ISCHEMIA: ICD-10-CM

## 2019-05-13 DIAGNOSIS — I10 ESSENTIAL HYPERTENSION: ICD-10-CM

## 2019-05-13 DIAGNOSIS — Z91.81 H/O FALLING: ICD-10-CM

## 2019-05-13 DIAGNOSIS — M05.732 RHEUMATOID ARTHRITIS INVOLVING BOTH WRISTS WITH POSITIVE RHEUMATOID FACTOR (HCC): ICD-10-CM

## 2019-05-13 DIAGNOSIS — E08.00 DIABETES MELLITUS DUE TO UNDERLYING CONDITION WITH HYPEROSMOLARITY WITHOUT COMA, WITHOUT LONG-TERM CURRENT USE OF INSULIN (HCC): ICD-10-CM

## 2019-05-13 DIAGNOSIS — I63.19 CEREBRAL INFARCTION DUE TO EMBOLISM OF OTHER PRECEREBRAL ARTERY (HCC): ICD-10-CM

## 2019-05-13 DIAGNOSIS — F02.80 LATE ONSET ALZHEIMER'S DISEASE WITHOUT BEHAVIORAL DISTURBANCE (HCC): Primary | ICD-10-CM

## 2019-05-13 DIAGNOSIS — I63.00 CEREBRAL INFARCTION DUE TO THROMBOSIS OF PRECEREBRAL ARTERY (HCC): ICD-10-CM

## 2019-05-13 DIAGNOSIS — E78.2 MIXED HYPERLIPIDEMIA: ICD-10-CM

## 2019-05-13 PROCEDURE — 1090F PRES/ABSN URINE INCON ASSESS: CPT | Performed by: PSYCHIATRY & NEUROLOGY

## 2019-05-13 PROCEDURE — G8400 PT W/DXA NO RESULTS DOC: HCPCS | Performed by: PSYCHIATRY & NEUROLOGY

## 2019-05-13 PROCEDURE — 4040F PNEUMOC VAC/ADMIN/RCVD: CPT | Performed by: PSYCHIATRY & NEUROLOGY

## 2019-05-13 PROCEDURE — G8419 CALC BMI OUT NRM PARAM NOF/U: HCPCS | Performed by: PSYCHIATRY & NEUROLOGY

## 2019-05-13 PROCEDURE — G8427 DOCREV CUR MEDS BY ELIG CLIN: HCPCS | Performed by: PSYCHIATRY & NEUROLOGY

## 2019-05-13 PROCEDURE — G8598 ASA/ANTIPLAT THER USED: HCPCS | Performed by: PSYCHIATRY & NEUROLOGY

## 2019-05-13 PROCEDURE — 1036F TOBACCO NON-USER: CPT | Performed by: PSYCHIATRY & NEUROLOGY

## 2019-05-13 PROCEDURE — 99215 OFFICE O/P EST HI 40 MIN: CPT | Performed by: PSYCHIATRY & NEUROLOGY

## 2019-05-13 PROCEDURE — 1123F ACP DISCUSS/DSCN MKR DOCD: CPT | Performed by: PSYCHIATRY & NEUROLOGY

## 2019-05-13 RX ORDER — DONEPEZIL HYDROCHLORIDE 5 MG/1
TABLET, FILM COATED ORAL
COMMUNITY
Start: 2019-04-23 | End: 2022-09-09 | Stop reason: SDUPTHER

## 2019-05-13 RX ORDER — LEVOTHYROXINE SODIUM 0.07 MG/1
75 TABLET ORAL
COMMUNITY

## 2019-05-13 ASSESSMENT — ENCOUNTER SYMPTOMS
VISUAL CHANGE: 0
EYE REDNESS: 0
EYE ITCHING: 0
PHOTOPHOBIA: 0
CHEST TIGHTNESS: 0
FACIAL SWELLING: 0
SORE THROAT: 0
ABDOMINAL DISTENTION: 0
SINUS PRESSURE: 0
CONSTIPATION: 0
ABDOMINAL PAIN: 0
VOMITING: 0
TROUBLE SWALLOWING: 0
DIARRHEA: 0
VOICE CHANGE: 0
EYE PAIN: 0
COUGH: 0
SHORTNESS OF BREATH: 0
CHOKING: 0
NAUSEA: 0
BACK PAIN: 0
APNEA: 0
WHEEZING: 0
BLOOD IN STOOL: 0
COLOR CHANGE: 0
BOWEL INCONTINENCE: 0
EYE DISCHARGE: 0

## 2019-05-13 NOTE — PATIENT INSTRUCTIONS
* FALL   PRECAUTIONS. *   ADEQUATE   FLUID  INTAKE   AND  ELECTROLYTE  BALANCE           * KEEP  DAIRY  OF   THE  NEUROLOGICAL  SYMPTOMS        *  TO  MAINTAIN  REGULAR  SLEEP  WAKE  CYCLES. *   TO  HAVE  ADEQUATE  REST  AND   SLEEP    HOURS.    *    AVOID  USAGE OF            TOBACCO,  EXCESSIVE  ALCOHOL  AND   ILLEGAL   SUBSTANCES,  IF  ANY        *  Maintain   Healthy  Life Style    With   Periodic  Monitoring  Of    Any  Medical  Conditions  Including   Elevated  Blood  Pressure,  Lipid  Profile,   Blood  Sugar levels  And   Heart  Disease. *   Period   Screening  For  Cancers  Involving  Breast,  Colon,   lungs  And  Other  Organs  As  Applicable,  In consultation   With  Your  Primary Care Providers. *  If   There is  Any  Significant  Worsening   Of  Current  Symptoms  And  Or  If    Any additional  New  Neurological  Symptoms  Or  Significant  Concerns   Should  Call  [phone] or  Go  To  Emergency  Department  For  Further  Immediate  Evaluation.

## 2019-05-13 NOTE — PROGRESS NOTES
[facility]  Neurology  [address] [place] [address]  Phone: [phone]   Fax: [phone]    SUBJECTIVE:     PATIENT ID:  Jose Yun is a  RIGHT HANDED [de-identified] y.o. female. Neurologic Problem   The patient's primary symptoms include clumsiness, focal sensory loss, a loss of balance, memory loss and weakness. The patient's pertinent negatives include no altered mental status, focal weakness, near-syncope, slurred speech, syncope or visual change. This is a chronic problem. Episode onset: MORE  THAN   5  YEARS. The neurological problem developed insidiously. The problem has been gradually worsening since onset. There was left-sided, lower extremity and right-sided focality noted. Pertinent negatives include no abdominal pain, auditory change, aura, back pain, bladder incontinence, bowel incontinence, chest pain, confusion, diaphoresis, dizziness, fatigue, fever, headaches, light-headedness, nausea, neck pain, palpitations, shortness of breath, vertigo or vomiting. Past treatments include medication. The treatment provided mild relief. Her past medical history is significant for dementia, head trauma and mood changes. There is no history of a bleeding disorder, a clotting disorder, a CVA, liver disease or seizures. History obtained from  The patient     AND  HER       and other  available medical records were  Also  reviewed. The  Duration,  Quality,  Severity,  Location,  Timing,  Context,  Modifying  Factors   Of   The   Chief   Complaint   And  Present  Illness   Was   Reviewed   In   Chronological   Manner.                                         CURRENT PATIENT'S  MAIN  CONCERNS INCLUDE :                       1)  CHRONIC    MEMORY  PROBLEMS   AND   DEMENTIA                                    MORE  THAN     10   YEARS                       2)  FAMILY    H/O    DEMENTIA                           3)     H/O    BEING   ENROLLED  IN   DEMENTIA   RESEARCH  STUDY PATIENT     INDICATED     SHE IS  OPTIMISTIC                                              AND     SELF  CARE  WITH    DAILY  ACTIVITIES OF  LIVING. PATIENT'S   SPOUSE IS  HELPING                                                         15)     VARIOUS  RISK   FACTORS   WERE  REVIEWED   AND   DISCUSSED. PATIENT   HAS  MULTIPLE   MEDICAL, MENTAL HEALTH                                                NEUROLOGICAL   PROBLEMS . PATIENT'S   MANAGEMENT  IS  CHALLENGING.                                16)         PATIENT  AND  HER    DENIES  ANY  NEW  NEUROLOGICAL                                    CONCERNS.                                             PRECIPITATING  FACTORS: including  fever/infection, exertion/relaxation, position change, stress, weather change, medications/alcohol, time of day/darkness/light  Are    absent                                                             MODIFYING  FACTORS:  fever/infection, exertion/relaxation, position change, stress, weather change, medications/alcohol, time of day/darkness/light     Are  absent         Patient   Indicates   The  Presence   And  The  Absence  Of  The  Following  Associated  And   Additional  Neurological    Symptoms:                                Balance  And coordination problems  present           Gait problems     present            Headaches      absent              Migraines           absent           Memory problems        present           Confusion        present            Paresthesia numbness          present           Seizures  And  Starring  Episodes           absent           Syncope,  Near  syncopal episodes         absent           Speech problems           absent             Swallowing  Problems      absent            Dizziness,  Light headedness           absent                        Vertigo        absent Generalized   Weakness    absent              focal  Weakness     present             Tremors         absent              Sleep  Problems     absent             History  Of   Recent   Head  Injury     absent             History  Of   Recent  TIA     absent             History  Of   Recent    Stroke     absent             Neck  Pain and  Neck muscle  Spasms  absent             Radiating  down   And   Weakness           absent            Lower back   Pain  And     Spasms  absent            Radiating    Down   And   Weakness          absent                H/O   FALLS        present               History  Of   Visual  Symptoms    absent                Associated   Diplopia       absent                                Also   Additional   Symptoms   Present    As  Documented    In   The detailed    Review  Of  Systems   And    Please   Refer   To    Them for   Additional  Information. Any components  That are either  Unobtainable  Or  Limited  In   HPI, ROS  And/or PFSH   Are   Due   To   Patient's  Medical  Problems,  Clinical  Condition and/or lack of other  Alternate resources.           RECORDS   REVIEWED:  historical medical records     INFORMATION   REVIEWED:     MEDICAL   HISTORY,     SURGICAL   HISTORY,   MEDICATIONS   LIST,   ALLERGIES AND  DRUG  INTOLERANCES,     FAMILY   HISTORY,  SOCIAL  HISTORY,    PROBLEM  LIST   FOR  PATIENT  CARE   COORDINATION    Past Medical History:   Diagnosis Date    Arthritis     Diabetes mellitus ([address]     Essential hypertension     Family history of clotting disorder     Family history of heart disease     History of pulmonary embolism     History of total hip replacement     Hypothyroidism     Mild cognitive impairment     Mitral valve prolapse     Mixed hyperlipidemia     Rheumatoid arthritis ([address]          Past Surgical History:   Procedure Laterality Date    CERVICAL SPINE SURGERY      HYSTERECTOMY      JOINT REPLACEMENT      MASTOIDECTOMY Right     THYROIDECTOMY      TYMPANOPLASTY Left          Current Outpatient Medications   Medication Sig Dispense Refill    donepezil (ARICEPT) 5 MG tablet TAKE 1 TABLET DAILY      rivaroxaban (XARELTO) 20 MG TABS tablet Take 20 mg by mouth daily (with breakfast)      ATENOLOL PO Take 75 mg by mouth Take 1 1/2 pills in am      omeprazole (PRILOSEC) 20 MG delayed release capsule Take 20 mg by mouth 2 times daily      simvastatin (ZOCOR) 40 MG tablet Take 40 mg by mouth every morning      escitalopram (LEXAPRO) 10 MG tablet Take 10 mg by mouth daily      memantine (NAMENDA) 10 MG tablet Take 10 mg by mouth daily      vitamin B-12 (CYANOCOBALAMIN) 1000 MCG tablet Take 1,000 mcg by mouth daily      Cholecalciferol (VITAMIN D-3) 1000 units CAPS Take by mouth daily      polycarbophil (FIBERCON) 625 MG tablet Take 625 mg by mouth daily      metFORMIN (GLUCOPHAGE) 500 MG tablet Take 500 mg by mouth 2 times daily (with meals)      Multiple Vitamins-Minerals (MULTIVITAMIN ADULT PO) Take by mouth      DiphenhydrAMINE HCl (BENADRYL ALLERGY PO) Take by mouth as needed      CIPROFLOXACIN PO Take 500 mg by mouth as needed      sulfamethoxazole-trimethoprim (BACTRIM DS;SEPTRA DS) 800-160 MG per tablet Take 1 tablet by mouth as needed      levothyroxine (SYNTHROID) 75 MCG tablet Take 75 mcg by mouth       No current facility-administered medications for this visit.           Allergies   Allergen Reactions    Codeine Nausea Only    Coumadin [Warfarin] Other (See Comments)     Could not stabalize    Fosamax [Alendronate] Nausea Only    Mercuric Bichloride Hives    Morphine Sulfate [Morphine] Nausea Only         Family History   Problem Relation Age of Onset    Diabetes Mother     Heart Disease Mother     Heart Disease Father     Cancer Sister     Cancer Brother          Social History     Socioeconomic History    Marital status:      Spouse name: Not on file    Number of children: Not on file   [facility] Years of education: Not on file    Highest education level: Not on file   Occupational History    Not on file   Social Needs    Financial resource strain: Not on file    Food insecurity:     Worry: Not on file     Inability: Not on file    Transportation needs:     Medical: Not on file     Non-medical: Not on file   Tobacco Use    Smoking status: Former Smoker     Last attempt to quit: 1994     Years since quittin.3    Smokeless tobacco: Never Used   Substance and Sexual Activity    Alcohol use: Yes     Alcohol/week: 0.6 oz     Types: 1 Glasses of wine per week     Comment: 1 glass occasionally    Drug use: No    Sexual activity: Yes     Partners: Male   Lifestyle    Physical activity:     Days per week: Not on file     Minutes per session: Not on file    Stress: Not on file   Relationships    Social connections:     Talks on phone: Not on file     Gets together: Not on file     Attends [employer] service: Not on file     Active member of club or organization: Not on file     Attends meetings of clubs or organizations: Not on file     Relationship status: Not on file    Intimate partner violence:     Fear of current or ex partner: Not on file     Emotionally abused: Not on file     Physically abused: Not on file     Forced sexual activity: Not on file   Other Topics Concern    Not on file   Social History Narrative    Not on file       Vitals:    19 1534   BP: 132/80   Pulse: 76         Wt Readings from Last 3 Encounters:   19 167 lb (75.8 kg)   10/16/18 163 lb 5.8 oz (74.1 kg)   18 163 lb 6.4 oz (74.1 kg)         BP Readings from Last 3 Encounters:   19 132/80   10/16/18 124/78   18 124/62         Review of Systems   Constitutional: Negative for appetite change, chills, diaphoresis, fatigue, fever and unexpected weight change.    HENT: Negative for congestion, dental problem, drooling, ear discharge, ear pain, facial swelling, hearing loss, mouth sores, nosebleeds, postnasal drip, sinus pressure, sore throat, tinnitus, trouble swallowing and voice change. Eyes: Negative for photophobia, pain, discharge, redness, itching and visual disturbance. Respiratory: Negative for apnea, cough, choking, chest tightness, shortness of breath and wheezing. Cardiovascular: Negative for chest pain, palpitations, leg swelling and near-syncope. Gastrointestinal: Negative for abdominal distention, abdominal pain, blood in stool, bowel incontinence, constipation, diarrhea, nausea and vomiting. Endocrine: Negative for cold intolerance, heat intolerance, polydipsia, polyphagia and polyuria. Genitourinary: Negative for bladder incontinence. Musculoskeletal: Positive for gait problem. Negative for arthralgias, back pain, joint swelling, myalgias, neck pain and neck stiffness. Skin: Negative for color change, pallor, rash and wound. Allergic/Immunologic: Negative for environmental allergies, food allergies and immunocompromised state. Neurological: Positive for weakness, numbness and loss of balance. Negative for dizziness, vertigo, tremors, focal weakness, seizures, syncope, facial asymmetry, speech difficulty, light-headedness and headaches. Hematological: Negative for adenopathy. Does not bruise/bleed easily. Psychiatric/Behavioral: Positive for decreased concentration and memory loss. Negative for agitation, behavioral problems, confusion, dysphoric mood, hallucinations, self-injury, sleep disturbance and suicidal ideas. The patient is nervous/anxious. The patient is not hyperactive. OBJECTIVE:    Physical Exam   Constitutional: She appears well-developed and well-nourished. She is cooperative. HENT:   Head: Normocephalic and atraumatic. Head is without raccoon's eyes and without Whitaker's sign.    Right Ear: External ear normal.   Left Ear: External ear normal.   Nose: Nose normal.   Mouth/Throat: Oropharynx is clear and moist.   Eyes: Conjunctivae are Equal.                          Extraocular   Movements  Are  Intact. No  Nystagmus. No  ANTON. No  Afferent  Pupillary  Defect noted. 5 CN :  Normal  Facial sensations and Corneal  Reflexes         7 CN :  Normal  Facial  Symmetry  And  Strength. No facial  Weakness. 8 CN :  Hearing  Appears   DECREASED        9, 10 CN: Normal spontaneous, reflex palate movements       11 CN:   Normal  Shoulder shrug and  strength       12 CN :   Normal  Tongue movements and  Tongue  In midline                      No tongue   Fasciculations or atrophy     C) MOTOR  EXAM:                 Strength  In upper  And  Lower extremities   within normal limits,                           EXCEPT    DECREASED   HAND               No  Drift. No  Atrophy             Rapid alternating  And  repetitions  Movements  within normal limits               Muscle  Tone  In upper  And  Lower  Extremities  normal              No rigidity. No  Spasticity. Bradykinesia   absent               No  Asterixis. Sustention  Tremor , Resting  Tremor   absent                  No other  Abnormal  Movements noted         D) SENSORY :             light touch, pinprick, position  And  Vibration  DECREASED    E) REFLEXES:                 Deep  Tendon  Reflexes   DECREASED                  No pathological  Reflexes  Bilaterally.                                 F) COORDINATION  AND  GAIT :                              Station and  Gait   SLOW                                      Romberg's test   POSITIVE                        Ataxia negative    ASSESSMENT:    Patient Active Problem List   Diagnosis    Rhe[address] [address]    Mixed hyperlipidemia    Mitral valve prolapse    Hypothyroidism    Family history of heart disease    Family history of clotting disorder    Essential hypertension    Diabetes mellitus ([address]    Arthritis    Late onset Alzheimer's disease without behavioral disturbance    Anxiety and depression    H/O falling    Confusion    Polyneuropathy associated with underlying disease ([address]    Mild closed head injury    Chronic cerebral ischemia    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       HISTORY:   ORDERING SYSTEM PROVIDED HISTORY: Cerebral infarction due to embolism of   other precerebral artery (HCC)       FINDINGS:       RIGHT:       The right common carotid artery demonstrates peak systolic velocities of 56,   49 cm/sec in the proximal and distal segments respectively.       The right internal carotid artery demonstrates the systolic velocities of 46,   71, 67 cm/sec in the proximal, mid and distal segments respectively.       The external carotid artery is patent.  The vertebral artery demonstrates   normal antegrade flow.       Scattered atherosclerosis.       ICA/CCA ratio of 0.9.           LEFT:       The left common carotid artery demonstrates peak systolic velocities of 66,   59 cm/sec in the proximal and distal segments respectively.       The left internal carotid artery demonstrates the systolic velocities of 43,   87, 82 cm/sec in the proximal, mid and distal segments respectively.       The external carotid artery is patent.  The vertebral artery demonstrates   normal antegrade flow.       Scattered atherosclerosis       ICA/CCA ratio of 0.9.           Impression   The right internal carotid artery demonstrates 0-50% stenosis .       The left internal carotid artery demonstrates 0-50% stenosis .       Bilateral vertebral arteries are patent with flow in the normal direction.               [address], [place]      Ordering physician: Gabi Jaquez    EXAMINATION: CT HEAD W/O CONTRAST  Date: 5/5/2018 7:37 PM  History: Female, 76years old.  Ground level fall hit face and head, on blood thinners     COMPARISON:  None. Technique: Noncontrast imaging of the head. FINDINGS:  Intracranial: No mass effect or midline shift. No CT evidence of acute ischemia or infarction. No abnormal extra-axial collections. No acute intracranial hemorrhage. Negative for hydrocephalus. The basilar cisterns and foramen magnum are preserved. Atrophy/white matter: Age appropriate cerebral volume and white matter. Soft tissues and scalp: There is a right supraorbital scalp hematoma. It measures 44 mm in diameter by 12 mm in thickness. Visualized orbits: Unremarkable. Sinuses and mastoids: Clear sinuses. There has been previous right mastoidectomy. Left mastoid air cells are unremarkable. Bones: The bony calvarium is intact. Nondisplaced nasal fractures are seen. Additional comments: None. IMPRESSION:     1.  No evidence of acute intracranial pathology. 2.  Nondisplaced nasal fractures  3.  Right supraorbital scalp hematoma  4.  Status post right mastoidectomy  [address] [facility]      Ordering physician: Seema Sherman    EXAMINATION: CT CERVICAL SPINE W/O CONTRAST    DATE OF EXAM: 5/5/2018 7:38 PM   CLINICAL INFORMATION: Ground level fall head Head    COMPARISON: None. TECHNIQUE: Multi-detector volumetric/helical data set was obtained through the cervical spine without contrast enhancement and viewed in bone windows. Additional sagittal and coronal reconstructions were also performed. FINDINGS:     Discs: Moderate multilevel spondylosis and degenerative facet arthropathy throughout with disc height loss most pronounced at C5-6 and C6-7. Alignment: The cervical vertebral bodies are in anatomic alignment. Prevertebral soft tissues: normal.     Bones: Odontoid is well seen and unremarkable. There is no evidence of fracture.     Surrounding soft tissues: Normal with no evidence of mass, adenopathy, abnormal fluid collection    Lung apices: Reviewed and   Discussed   With  patient. * Goals  And  Expectations  Of  The  Therapy  Discussed   And  Reviewed. *   Benefits   And   Side  Effect  Profile  Of  Medication/s   Were   Discussed             * Need   For  Further   Follow up For  The  Various  Problems  Were discussed. * Results  Of  The  Previous  Diagnostic tests were reviewed and questions answered. patient  understand the same. Medical  Decision  Making  Was  Made  Based on the   Complexity  Of  Above  Mentioned  Diagnoses,    Data reviewed   & diagnostic  Tests  Reviewed,  Risk  Of  Significant   Co morbidities and complicating   Factors. Medical  Decision  Was   High  Complexity  Due   To  The  Patient's  Multiple  Symptoms,  Advancing   Disease,  Complex  Treatment  Regimen,  Multiple medications and   Risk  Of   Side  Effects,  Difficulty  In  Medication  Management  And  Diagnostic  Challenges   In  View  Of  The  Associated   Co  Morbid  Conditions   And  Problems. * FALL   PRECAUTIONS. THESE  REVIEWED   AND  DISCUSSED    *  USE   WALKING  ASSISTANCE  DEVICES     QUAD  CANE       *   BE  CAREFUL  WITH  ACTIVITIES   INCLUDING  DRIVING. *   ADEQUATE   FLUID  INTAKE   AND  ELECTROLYTE  BALANCE         * KEEP  DAIRY  OF   THE  NEUROLOGICAL  SYMPTOMS        RECORDING THE    DURATION  AND  FREQUENCY. *  AVOID    CONDITIONS  AND  FACTORS   THAT  MAKE                  NEUROLOGICAL  SYMPTOMS  WORSE. *  TO  MAINTAIN  REGULAR  SLEEP  WAKE  CYCLES.      *   TO  HAVE  ADEQUATE  REST  AND   SLEEP    HOURS.          *    AVOID  ANY USAGE OF                 TOBACCO,  EXCESSIVE  ALCOHOL  AND   ILLEGAL   SUBSTANCES      *  CONTINUE MEDICATIONS PRESCRIBED BY NEUROLOGIST AS    RECOMMENDED     *   Compliance   With  Medications   And  Instructions      *  May   Use  Pill  Box,    If  Needed      *    Prophylactic  Use   Of     Vitamin   B   Complex,  Folic  Acid, Develops   Any additional  New  Neurological  Symptoms  Or  Significant  Concerns   Should  Call  911 or  Go  To  Emergency  Department  For  Further  Immediate  Evaluation. *   The  Neurological   Findings,  Possible  Diagnosis,  Differential diagnoses   And  Options  For    Further   Investigations   And  management   Are  Discussed  Comprehensively. Medications   And  Prescription   Risks  And  Side effects  Are   Also  Discussed. The  Above  Were  Reviewed  With  patient and  and   questions  Answered  In  Detail. More   Than   50% of face  To face Time   Was  Spent  On  Counseling   And   Coordination  Of  Care   Of   Patient's multiple   Neurological  Problems   And   Comorbid  Medical   Conditions. Electronically signed by Mitzy Hardin MD   Board Certified in  Neurology &  In  [facility] ([facility])      DISCLAIMER:   Although every effort was made to ensure the accuracy of this  electronic transcription, some errors in transcription may have occurred. GENERAL PATIENT INSTRUCTIONS:     A Healthy Lifestyle: Care Instructions  Your Care Instructions  A healthy lifestyle can help you feel good, stay at a healthy weight, and have plenty of energy for both work and play. A healthy lifestyle is something you can share with your whole family. A healthy lifestyle also can lower your risk for serious health problems, such as high blood pressure, heart disease, and diabetes. You can follow a few steps listed below to improve your health and the health of your family. Follow-up care is a key part of your treatment and safety. Be sure to make and go to all appointments, and call your doctor if you are having problems. Its also a good idea to know your test results and keep a list of the medicines you take. How can you care for yourself at home? Do not eat too much sugar, fat, or fast foods.  You after you quit. Limit how much alcohol you drink. Moderate amounts of alcohol (up to 2 drinks a day for men, 1 drink a day for women) are okay. But drinking too much can lead to liver problems, high blood pressure, and other health problems. Family health  If you have a family, there are many things you can do together to improve your health. Eat meals together as a family as often as possible. Eat healthy foods. This includes fruits, vegetables, lean meats and dairy, and whole grains. Include your family in your fitness plan. Most people think of activities such as jogging or tennis as the way to fitness, but there are many ways you and your family can be more active. Anything that makes you breathe hard and gets your heart pumping is exercise. Here are some tips:  Walk to do errands or to take your child to school or the bus. Go for a family bike ride after dinner instead of watching TV. Where can you learn more? Go to https://Ember Entertainmentpe1006.tv.healthAtritech. org and sign in to your [employer] account. Enter U444 in the TrackR box to learn more about \"A Healthy Lifestyle: Care Instructions. \"     If you do not have an account, please click on the \"Sign Up Now\" link. Current as of: July 26, 2016  Content Version: 11.2  © 4386-3019 [employer]. Care instructions adapted under license by [facility] ([facility]). If you have questions about a medical condition or this instruction, always ask your healthcare professional. [place] [address] any warranty or liability for your use of this information.

## 2019-11-14 ENCOUNTER — OFFICE VISIT (OUTPATIENT)
Dept: NEUROLOGY | Age: 79
End: 2019-11-14
Payer: MEDICARE

## 2019-11-14 VITALS
WEIGHT: 166.8 LBS | RESPIRATION RATE: 16 BRPM | HEIGHT: 64 IN | HEART RATE: 72 BPM | SYSTOLIC BLOOD PRESSURE: 118 MMHG | DIASTOLIC BLOOD PRESSURE: 66 MMHG | BODY MASS INDEX: 28.48 KG/M2

## 2019-11-14 DIAGNOSIS — M19.90 ARTHRITIS: ICD-10-CM

## 2019-11-14 DIAGNOSIS — F02.80 LATE ONSET ALZHEIMER'S DISEASE WITHOUT BEHAVIORAL DISTURBANCE (HCC): Primary | ICD-10-CM

## 2019-11-14 DIAGNOSIS — G63 POLYNEUROPATHY ASSOCIATED WITH UNDERLYING DISEASE (HCC): ICD-10-CM

## 2019-11-14 DIAGNOSIS — F32.A ANXIETY AND DEPRESSION: ICD-10-CM

## 2019-11-14 DIAGNOSIS — M05.732 RHEUMATOID ARTHRITIS INVOLVING BOTH WRISTS WITH POSITIVE RHEUMATOID FACTOR (HCC): ICD-10-CM

## 2019-11-14 DIAGNOSIS — Z91.81 H/O FALLING: ICD-10-CM

## 2019-11-14 DIAGNOSIS — Z83.2 FAMILY HISTORY OF CLOTTING DISORDER: ICD-10-CM

## 2019-11-14 DIAGNOSIS — R41.0 CONFUSION: ICD-10-CM

## 2019-11-14 DIAGNOSIS — Z82.49 FAMILY HISTORY OF HEART DISEASE: ICD-10-CM

## 2019-11-14 DIAGNOSIS — I63.19 CEREBRAL INFARCTION DUE TO EMBOLISM OF OTHER PRECEREBRAL ARTERY (HCC): ICD-10-CM

## 2019-11-14 DIAGNOSIS — E78.2 MIXED HYPERLIPIDEMIA: ICD-10-CM

## 2019-11-14 DIAGNOSIS — E03.9 HYPOTHYROIDISM, UNSPECIFIED TYPE: ICD-10-CM

## 2019-11-14 DIAGNOSIS — G30.1 LATE ONSET ALZHEIMER'S DISEASE WITHOUT BEHAVIORAL DISTURBANCE (HCC): Primary | ICD-10-CM

## 2019-11-14 DIAGNOSIS — M05.731 RHEUMATOID ARTHRITIS INVOLVING BOTH WRISTS WITH POSITIVE RHEUMATOID FACTOR (HCC): ICD-10-CM

## 2019-11-14 DIAGNOSIS — I67.82 CHRONIC CEREBRAL ISCHEMIA: ICD-10-CM

## 2019-11-14 DIAGNOSIS — I10 ESSENTIAL HYPERTENSION: ICD-10-CM

## 2019-11-14 DIAGNOSIS — S09.90XS MILD CLOSED HEAD INJURY, SEQUELA: ICD-10-CM

## 2019-11-14 DIAGNOSIS — F41.9 ANXIETY AND DEPRESSION: ICD-10-CM

## 2019-11-14 DIAGNOSIS — E08.00 DIABETES MELLITUS DUE TO UNDERLYING CONDITION WITH HYPEROSMOLARITY WITHOUT COMA, WITHOUT LONG-TERM CURRENT USE OF INSULIN (HCC): ICD-10-CM

## 2019-11-14 PROCEDURE — G8417 CALC BMI ABV UP PARAM F/U: HCPCS | Performed by: PSYCHIATRY & NEUROLOGY

## 2019-11-14 PROCEDURE — 4040F PNEUMOC VAC/ADMIN/RCVD: CPT | Performed by: PSYCHIATRY & NEUROLOGY

## 2019-11-14 PROCEDURE — G8598 ASA/ANTIPLAT THER USED: HCPCS | Performed by: PSYCHIATRY & NEUROLOGY

## 2019-11-14 PROCEDURE — 1090F PRES/ABSN URINE INCON ASSESS: CPT | Performed by: PSYCHIATRY & NEUROLOGY

## 2019-11-14 PROCEDURE — G8484 FLU IMMUNIZE NO ADMIN: HCPCS | Performed by: PSYCHIATRY & NEUROLOGY

## 2019-11-14 PROCEDURE — G8427 DOCREV CUR MEDS BY ELIG CLIN: HCPCS | Performed by: PSYCHIATRY & NEUROLOGY

## 2019-11-14 PROCEDURE — 1036F TOBACCO NON-USER: CPT | Performed by: PSYCHIATRY & NEUROLOGY

## 2019-11-14 PROCEDURE — G8400 PT W/DXA NO RESULTS DOC: HCPCS | Performed by: PSYCHIATRY & NEUROLOGY

## 2019-11-14 PROCEDURE — 1123F ACP DISCUSS/DSCN MKR DOCD: CPT | Performed by: PSYCHIATRY & NEUROLOGY

## 2019-11-14 PROCEDURE — 99215 OFFICE O/P EST HI 40 MIN: CPT | Performed by: PSYCHIATRY & NEUROLOGY

## 2019-11-14 ASSESSMENT — ENCOUNTER SYMPTOMS
PHOTOPHOBIA: 0
ABDOMINAL PAIN: 0
VOICE CHANGE: 0
CHEST TIGHTNESS: 0
APNEA: 0
BLOOD IN STOOL: 0
EYE REDNESS: 0
CHOKING: 0
ABDOMINAL DISTENTION: 0
SORE THROAT: 0
TROUBLE SWALLOWING: 0
BACK PAIN: 0
EYE ITCHING: 0
COUGH: 0
EYE PAIN: 0
DIARRHEA: 0
NAUSEA: 0
EYE DISCHARGE: 0
CONSTIPATION: 0
VOMITING: 0
SINUS PRESSURE: 0
FACIAL SWELLING: 0
BOWEL INCONTINENCE: 0
COLOR CHANGE: 0
VISUAL CHANGE: 0
SHORTNESS OF BREATH: 0
WHEEZING: 0

## 2020-06-12 ENCOUNTER — OFFICE VISIT (OUTPATIENT)
Dept: NEUROLOGY | Age: 80
End: 2020-06-12
Payer: MEDICARE

## 2020-06-12 VITALS
WEIGHT: 167.2 LBS | HEIGHT: 65 IN | HEART RATE: 82 BPM | SYSTOLIC BLOOD PRESSURE: 114 MMHG | DIASTOLIC BLOOD PRESSURE: 64 MMHG | TEMPERATURE: 97.2 F | BODY MASS INDEX: 27.86 KG/M2 | OXYGEN SATURATION: 91 %

## 2020-06-12 PROCEDURE — 4040F PNEUMOC VAC/ADMIN/RCVD: CPT | Performed by: PSYCHIATRY & NEUROLOGY

## 2020-06-12 PROCEDURE — G8427 DOCREV CUR MEDS BY ELIG CLIN: HCPCS | Performed by: PSYCHIATRY & NEUROLOGY

## 2020-06-12 PROCEDURE — 1123F ACP DISCUSS/DSCN MKR DOCD: CPT | Performed by: PSYCHIATRY & NEUROLOGY

## 2020-06-12 PROCEDURE — 1036F TOBACCO NON-USER: CPT | Performed by: PSYCHIATRY & NEUROLOGY

## 2020-06-12 PROCEDURE — 99214 OFFICE O/P EST MOD 30 MIN: CPT

## 2020-06-12 PROCEDURE — G8417 CALC BMI ABV UP PARAM F/U: HCPCS | Performed by: PSYCHIATRY & NEUROLOGY

## 2020-06-12 PROCEDURE — G8400 PT W/DXA NO RESULTS DOC: HCPCS | Performed by: PSYCHIATRY & NEUROLOGY

## 2020-06-12 PROCEDURE — 1090F PRES/ABSN URINE INCON ASSESS: CPT | Performed by: PSYCHIATRY & NEUROLOGY

## 2020-06-12 PROCEDURE — 99215 OFFICE O/P EST HI 40 MIN: CPT | Performed by: PSYCHIATRY & NEUROLOGY

## 2020-06-12 ASSESSMENT — ENCOUNTER SYMPTOMS
SORE THROAT: 0
VOMITING: 0
BOWEL INCONTINENCE: 0
SINUS PRESSURE: 0
PHOTOPHOBIA: 0
DIARRHEA: 0
BLOOD IN STOOL: 0
VOICE CHANGE: 0
TROUBLE SWALLOWING: 0
ABDOMINAL DISTENTION: 0
FACIAL SWELLING: 0
APNEA: 0
EYE ITCHING: 0
VISUAL CHANGE: 0
EYE REDNESS: 0
NAUSEA: 0
EYE PAIN: 0
COLOR CHANGE: 0
ABDOMINAL PAIN: 0
SHORTNESS OF BREATH: 0
CONSTIPATION: 0
CHEST TIGHTNESS: 0
WHEEZING: 0
EYE DISCHARGE: 0
CHOKING: 0
COUGH: 0
BACK PAIN: 0

## 2020-06-12 NOTE — PATIENT INSTRUCTIONS
* FALL   PRECAUTIONS. *  USE   WALKING  ASSISTANCE  DEVICES     QUAD  CANE  /   WALKER      *   SUPERVISED  CARE      *   ADEQUATE   FLUID  INTAKE   AND  ELECTROLYTE  BALANCE           * KEEP  DAIRY  OF   THE  NEUROLOGICAL  SYMPTOMS          *  TO  MAINTAIN  REGULAR  SLEEP  WAKE  CYCLES. *   TO  HAVE  ADEQUATE  REST  AND   SLEEP    HOURS.        *    AVOID  USAGE OF   TOBACCO,  EXCESSIVE  ALCOHOL                AND   ILLEGAL   SUBSTANCES,  IF  ANY          *  Maintain   Healthy  Life Style    With   Periodic  Monitoring  Of      Any  Medical  Conditions  Including   Elevated  Blood  Pressure,  Lipid  Profile,     Blood  Sugar levels  And   Heart  Disease. *   Period   Screening  For  Cancers  Involving  Breast,  Colon,   lungs  And  Other  Organs  As  Applicable,  In consultation   With  Your  Primary Care Providers. *  If   There is  Any  Significant  Worsening   Of  Current  Symptoms  And  Or  If    Any additional  New  Neurological  Symptoms                 Or  Significant  Concerns   Should  Call  911 or  Go  To  Emergency  Department  For  Further  Immediate  Evaluation.

## 2020-06-12 NOTE — PROGRESS NOTES
TCD   ABNORMAL    AND  PATIENT  ALREADY  ON   [facility]                                         FOR  CARDIOPULMONARY      REASONS. 14)      PREVIOUS    H/O    CONFUSION                                     NO  RECURRENCE  OF  THE  SAME. PATIENT   AND  HER     DENIES    ANY    HALLUCINATIONS                                             OR  DELUSIONS                                                     15)          PATIENT     INDICATED     SHE IS  OPTIMISTIC                                              AND     SELF  CARE  WITH    DAILY  ACTIVITIES OF  LIVING. PATIENT'S   SPOUSE IS  HELPING                                                                     16)     VARIOUS  RISK   FACTORS   WERE  REVIEWED   AND   DISCUSSED. PATIENT   HAS  MULTIPLE   MEDICAL, MENTAL HEALTH                                                NEUROLOGICAL   PROBLEMS . PATIENT'S   MANAGEMENT  IS  CHALLENGING.                                                           17)         PATIENT  AND  HER    DENIES  ANY  NEW  NEUROLOGICAL                                                          CONCERNS.                                         PRECIPITATING  FACTORS: including  fever/infection, exertion/relaxation, position change, stress, weather     change, medications/alcohol, time of day/darkness/light  Are    Absent                                                               MODIFYING  FACTORS:  fever/infection, exertion/relaxation, position change, stress, weather change,     medications/alcohol, time of day/darkness/light     Are  absent             Patient   Indicates   The  Presence   And  The  Absence  Of  The  Following  Associated  And   Additional  Neurological    Symptoms:                                Balance  And  Not on file   Social History Narrative    Not on file       Vitals:    06/12/20 1456   BP: 114/64   Pulse: 82   Temp: 97.2 °F (36.2 °C)   SpO2: 91%         Wt Readings from Last 3 Encounters:   06/12/20 167 lb 3.2 oz (75.8 kg)   11/14/19 166 lb 12.8 oz (75.7 kg)   05/13/19 167 lb (75.8 kg)         BP Readings from Last 3 Encounters:   06/12/20 114/64   11/14/19 118/66   05/13/19 132/80         Review of Systems   Constitutional: Negative for appetite change, chills, diaphoresis, fatigue, fever and unexpected weight change. HENT: Negative for congestion, dental problem, drooling, ear discharge, ear pain, facial swelling, hearing loss, mouth sores, nosebleeds, postnasal drip, sinus pressure, sore throat, tinnitus, trouble swallowing and voice change. Eyes: Negative for photophobia, pain, discharge, redness, itching and visual disturbance. Respiratory: Negative for apnea, cough, choking, chest tightness, shortness of breath and wheezing. Cardiovascular: Negative for chest pain, palpitations, leg swelling and near-syncope. Gastrointestinal: Negative for abdominal distention, abdominal pain, blood in stool, bowel incontinence, constipation, diarrhea, nausea and vomiting. Endocrine: Negative for cold intolerance, heat intolerance, polydipsia, polyphagia and polyuria. Genitourinary: Negative for bladder incontinence. Musculoskeletal: Positive for gait problem. Negative for arthralgias, back pain, joint swelling, myalgias, neck pain and neck stiffness. Skin: Negative for color change, pallor, rash and wound. Allergic/Immunologic: Negative for environmental allergies, food allergies and immunocompromised state. Neurological: Positive for weakness, numbness and loss of balance. Negative for dizziness, vertigo, tremors, focal weakness, seizures, syncope, facial asymmetry, speech difficulty, light-headedness and headaches. Hematological: Negative for adenopathy. Does not bruise/bleed easily. Movements noted           D) SENSORY :               light touch, pinprick, position  And  Vibration  DECREASED      E) REFLEXES:                   Deep  Tendon  Reflexes   DECREASED                    No pathological  Reflexes  Bilaterally.                                   F) COORDINATION  AND  GAIT :                                Station and  Gait   SLOW                                        Romberg's test   POSITIVE                          Ataxia negative          ASSESSMENT:    Patient Active Problem List   Diagnosis    Rheumatoid arthritis ([facility] [address]    Mixed hyperlipidemia    Mitral valve prolapse    Hypothyroidism    Family history of heart disease    Family history of clotting disorder    Essential hypertension    Diabetes mellitus ([facility] [address]    Arthritis    Late onset Alzheimer's disease without behavioral disturbance ([facility] [address]    Anxiety and depression    H/O falling    Confusion    Polyneuropathy associated with underlying disease ([facility] [address]    Mild closed head injury    Chronic cerebral ischemia    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       HISTORY:   ORDERING SYSTEM PROVIDED HISTORY: Cerebral infarction due to embolism of   other precerebral artery (HCC)       FINDINGS:       RIGHT:       The right common carotid artery demonstrates peak systolic velocities of 56,   49 cm/sec in the proximal and distal segments respectively.       The right internal carotid artery demonstrates the systolic velocities of 46,   71, 67 cm/sec in the proximal, mid and distal segments respectively.       The external carotid artery is patent.  The vertebral artery demonstrates   normal antegrade flow.       Scattered atherosclerosis.       ICA/CCA ratio of 0.9.           LEFT:       The left common carotid artery demonstrates peak systolic velocities of 66,   59 cm/sec in the proximal and distal JOSE    EXAMINATION: CT CERVICAL SPINE W/O CONTRAST    DATE OF EXAM: 5/5/2018 7:38 PM   CLINICAL INFORMATION: Ground level fall head Head    COMPARISON: None. TECHNIQUE: Multi-detector volumetric/helical data set was obtained through the cervical spine without contrast enhancement and viewed in bone windows. Additional sagittal and coronal reconstructions were also performed. FINDINGS:     Discs: Moderate multilevel spondylosis and degenerative facet arthropathy throughout with disc height loss most pronounced at C5-6 and C6-7. Alignment: The cervical vertebral bodies are in anatomic alignment. Prevertebral soft tissues: normal.     Bones: Odontoid is well seen and unremarkable. There is no evidence of fracture. Surrounding soft tissues: Normal with no evidence of mass, adenopathy, abnormal fluid collection    Lung apices: Clear. Visualized skull base: Right mastoidectomy changes and small right mastoid effusion. IMPRESSION:    1.  Moderate multilevel spondylosis and degenerative facet arthropathy throughout with disc height loss most pronounced at C5-6 and C6-7.  2.  Right mastoidectomy changes and small right mastoid effusion. WSN:[id number]      Electronically Stiven Ness MD  Signed Date/Time:  5/5/2018 8:08:00 PM      VISITING DIAGNOSIS:      ICD-10-CM    1. Late onset Alzheimer's disease without behavioral disturbance ([facility]) G30.1     F02.80    2. 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 Hypothyroidism, unspecified type E03.9    4. Diabetes mellitus due to underlying condition with hyperosmolarity without coma, without long-term current use of insulin ([facility]) E08.00    5. Family history of heart disease Z82.49    6. 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 Chronic cerebral ischemia I67.82    8. Mitral valve prolapse I34.1    9. Essential hypertension I10    10. Associated   Co  Morbid  Conditions   And  Problems. * FALL   PRECAUTIONS. THESE  REVIEWED   AND  DISCUSSED    *  USE   WALKING  ASSISTANCE  DEVICES     QUAD  CANE       *  AVOID  DRIVING         *    SUPERVISED    CARE           *   BE  CAREFUL  WITH  ACTIVITIES  . *   ADEQUATE   FLUID  INTAKE   AND  ELECTROLYTE  BALANCE           * KEEP  DAIRY  OF   THE  NEUROLOGICAL  SYMPTOMS        RECORDING THE    DURATION  AND  FREQUENCY. *  AVOID    CONDITIONS  AND  FACTORS   THAT  MAKE                  NEUROLOGICAL  SYMPTOMS  WORSE. *  TO  MAINTAIN  REGULAR  SLEEP  WAKE  CYCLES. *   TO  HAVE  ADEQUATE  REST  AND   SLEEP    HOURS.          *    AVOID  ANY USAGE OF                   TOBACCO,  EXCESSIVE  ALCOHOL  AND   ILLEGAL   SUBSTANCES          *  CONTINUE MEDICATIONS PRESCRIBED BY NEUROLOGIST AS    RECOMMENDED     *   Compliance   With  Medications   And  Instructions          *  May   Use  Pill  Box,    If  Needed      *    Prophylactic  Use   Of     Vitamin   B   Complex,  Folic  Acid,    Vitamin  B12    Multivitamin,       Calcium  With  magnesium  And  Vit D    Supplementations   Over  The  Counter  Discussed           *  FOOT  CARE, DAILY  INSPECTION  OF  FEET   AND         PERIODIC  PODIATRY EVALUATIONS .           *  PATIENT  IS  ALSO   COUNSELED   TO  KEEP    ACTIVITIES:           A)   SIMPLE      B)  ORGANIZED      C)  WRITE   DOWN                          -   REVIEWED    WITH  PATIENT  AND  HER  SPOUSE.                      *  EVALUATIONS  AND  FOLLOW UP:    PERIODICALLY                 * PHYSICAL  THERAPY   / OCCUPATIONAL THERAPY                                * CARDIOLOGY              *  ORTHO                  * RHEUMATOLOGY                         *PATIENT   TO  FOLLOW  UP  WITH   PRIMARY  CARE            AND   OTHER  CONSULTANTS  AS  BEFORE.             *TO  FOLLOW  WITH   MENTAL  HEALTH  PROFESSIONALS ,  INCLUDING            PSYCHOLOGICAL  COUNSELING   AND of your daily routine. You may want to start with simple activities, such as walking, bicycling, or slow swimming. Try to be active 30 to 60 minutes every day. You do not need to do all 30 to 60 minutes all at once. For example, you can exercise 3 times a day for 10 or 20 minutes. Moderate exercise is safe for most people, but it is always a good idea to talk to your doctor before starting an exercise program.  Keep moving. Madina Coral the lawn, work in the garden, or [employer]. Take the stairs instead of the elevator at work. If you smoke, quit. People who smoke have an increased risk for heart attack, stroke, cancer, and other lung illnesses. Quitting is hard, but there are ways to boost your chance of quitting tobacco for good. Use nicotine gum, patches, or lozenges. Ask your doctor about stop-smoking programs and medicines. Keep trying. In addition to reducing your risk of diseases in the future, you will notice some benefits soon after you stop using tobacco. If you have shortness of breath or asthma symptoms, they will likely get better within a few weeks after you quit. Limit how much alcohol you drink. Moderate amounts of alcohol (up to 2 drinks a day for men, 1 drink a day for women) are okay. But drinking too much can lead to liver problems, high blood pressure, and other health problems. Family health  If you have a family, there are many things you can do together to improve your health. Eat meals together as a family as often as possible. Eat healthy foods. This includes fruits, vegetables, lean meats and dairy, and whole grains. Include your family in your fitness plan. Most people think of activities such as jogging or tennis as the way to fitness, but there are many ways you and your family can be more active. Anything that makes you breathe hard and gets your heart pumping is exercise. Here are some tips:  Walk to do errands or to take your child to school or the bus.   Go for a family bike

## 2021-01-07 ENCOUNTER — OFFICE VISIT (OUTPATIENT)
Dept: NEUROLOGY | Age: 81
End: 2021-01-07
Payer: MEDICARE

## 2021-01-07 VITALS
DIASTOLIC BLOOD PRESSURE: 80 MMHG | RESPIRATION RATE: 16 BRPM | WEIGHT: 158 LBS | TEMPERATURE: 97 F | OXYGEN SATURATION: 91 % | BODY MASS INDEX: 26.33 KG/M2 | HEIGHT: 65 IN | HEART RATE: 75 BPM | SYSTOLIC BLOOD PRESSURE: 126 MMHG

## 2021-01-07 DIAGNOSIS — Z79.4 TYPE 2 DIABETES MELLITUS WITH DIABETIC NEUROPATHY, WITH LONG-TERM CURRENT USE OF INSULIN (HCC): ICD-10-CM

## 2021-01-07 DIAGNOSIS — Z83.2 FAMILY HISTORY OF CLOTTING DISORDER: ICD-10-CM

## 2021-01-07 DIAGNOSIS — Z91.199 NON COMPLIANCE WITH MEDICAL TREATMENT: ICD-10-CM

## 2021-01-07 DIAGNOSIS — F32.A ANXIETY AND DEPRESSION: ICD-10-CM

## 2021-01-07 DIAGNOSIS — S09.90XS MILD CLOSED HEAD INJURY, SEQUELA: ICD-10-CM

## 2021-01-07 DIAGNOSIS — I67.82 CHRONIC CEREBRAL ISCHEMIA: ICD-10-CM

## 2021-01-07 DIAGNOSIS — E11.40 TYPE 2 DIABETES MELLITUS WITH DIABETIC NEUROPATHY, WITH LONG-TERM CURRENT USE OF INSULIN (HCC): ICD-10-CM

## 2021-01-07 DIAGNOSIS — Z82.49 FAMILY HISTORY OF HEART DISEASE: ICD-10-CM

## 2021-01-07 DIAGNOSIS — I10 ESSENTIAL HYPERTENSION: ICD-10-CM

## 2021-01-07 DIAGNOSIS — R41.0 CONFUSION: ICD-10-CM

## 2021-01-07 DIAGNOSIS — Z91.81 H/O FALLING: ICD-10-CM

## 2021-01-07 DIAGNOSIS — E78.2 MIXED HYPERLIPIDEMIA: ICD-10-CM

## 2021-01-07 DIAGNOSIS — I63.19 CEREBRAL INFARCTION DUE TO EMBOLISM OF OTHER PRECEREBRAL ARTERY (HCC): ICD-10-CM

## 2021-01-07 DIAGNOSIS — F41.9 ANXIETY AND DEPRESSION: ICD-10-CM

## 2021-01-07 DIAGNOSIS — Z87.898 H/O URINARY INCONTINENCE: ICD-10-CM

## 2021-01-07 DIAGNOSIS — E03.9 ACQUIRED HYPOTHYROIDISM: ICD-10-CM

## 2021-01-07 DIAGNOSIS — F02.80 LATE ONSET ALZHEIMER'S DISEASE WITHOUT BEHAVIORAL DISTURBANCE (HCC): Primary | ICD-10-CM

## 2021-01-07 DIAGNOSIS — G63 POLYNEUROPATHY ASSOCIATED WITH UNDERLYING DISEASE (HCC): ICD-10-CM

## 2021-01-07 DIAGNOSIS — I34.1 MITRAL VALVE PROLAPSE: ICD-10-CM

## 2021-01-07 DIAGNOSIS — G30.1 LATE ONSET ALZHEIMER'S DISEASE WITHOUT BEHAVIORAL DISTURBANCE (HCC): Primary | ICD-10-CM

## 2021-01-07 DIAGNOSIS — M19.90 ARTHRITIS: ICD-10-CM

## 2021-01-07 PROCEDURE — G8484 FLU IMMUNIZE NO ADMIN: HCPCS | Performed by: PSYCHIATRY & NEUROLOGY

## 2021-01-07 PROCEDURE — 1036F TOBACCO NON-USER: CPT | Performed by: PSYCHIATRY & NEUROLOGY

## 2021-01-07 PROCEDURE — G8400 PT W/DXA NO RESULTS DOC: HCPCS | Performed by: PSYCHIATRY & NEUROLOGY

## 2021-01-07 PROCEDURE — 99214 OFFICE O/P EST MOD 30 MIN: CPT | Performed by: PSYCHIATRY & NEUROLOGY

## 2021-01-07 PROCEDURE — G8417 CALC BMI ABV UP PARAM F/U: HCPCS | Performed by: PSYCHIATRY & NEUROLOGY

## 2021-01-07 PROCEDURE — 1123F ACP DISCUSS/DSCN MKR DOCD: CPT | Performed by: PSYCHIATRY & NEUROLOGY

## 2021-01-07 PROCEDURE — 99215 OFFICE O/P EST HI 40 MIN: CPT | Performed by: PSYCHIATRY & NEUROLOGY

## 2021-01-07 PROCEDURE — G8427 DOCREV CUR MEDS BY ELIG CLIN: HCPCS | Performed by: PSYCHIATRY & NEUROLOGY

## 2021-01-07 PROCEDURE — 4040F PNEUMOC VAC/ADMIN/RCVD: CPT | Performed by: PSYCHIATRY & NEUROLOGY

## 2021-01-07 PROCEDURE — 1090F PRES/ABSN URINE INCON ASSESS: CPT | Performed by: PSYCHIATRY & NEUROLOGY

## 2021-01-07 RX ORDER — MEMANTINE HYDROCHLORIDE 10 MG/1
10 TABLET ORAL 2 TIMES DAILY
Qty: 60 TABLET | Refills: 5 | Status: SHIPPED | OUTPATIENT
Start: 2021-01-07 | End: 2021-01-12 | Stop reason: SDUPTHER

## 2021-01-07 ASSESSMENT — ENCOUNTER SYMPTOMS
VISUAL CHANGE: 0
CHEST TIGHTNESS: 0
ABDOMINAL PAIN: 0
BACK PAIN: 0
EYE REDNESS: 0
COLOR CHANGE: 0
SORE THROAT: 0
NAUSEA: 0
EYE PAIN: 0
DIARRHEA: 0
CONSTIPATION: 0
PHOTOPHOBIA: 0
VOICE CHANGE: 0
VOMITING: 0
COUGH: 0
TROUBLE SWALLOWING: 0
APNEA: 0
ABDOMINAL DISTENTION: 0
EYE ITCHING: 0
WHEEZING: 0
SHORTNESS OF BREATH: 0
EYE DISCHARGE: 0
FACIAL SWELLING: 0
BLOOD IN STOOL: 0
SINUS PRESSURE: 0
CHOKING: 0
BOWEL INCONTINENCE: 0

## 2021-01-07 NOTE — PATIENT INSTRUCTIONS
* FALL   PRECAUTIONS. *   SUPERVISED    CARE        *   ADEQUATE   FLUID  INTAKE   AND  ELECTROLYTE  BALANCE           * KEEP  DAIRY  OF   THE  NEUROLOGICAL  SYMPTOMS          *  TO  MAINTAIN  REGULAR  SLEEP  WAKE  CYCLES. *   TO  HAVE  ADEQUATE  REST  AND   SLEEP    HOURS.        *    AVOID  USAGE OF   TOBACCO,  EXCESSIVE  ALCOHOL                AND   ILLEGAL   SUBSTANCES,  IF  ANY          *  Maintain   Healthy  Life Style    With   Periodic  Monitoring  Of      Any  Medical  Conditions  Including   Elevated  Blood  Pressure,  Lipid  Profile,     Blood  Sugar levels  And   Heart  Disease. *   Period   Screening  For  Cancers  Involving  Breast,  Colon,   lungs  And  Other  Organs  As  Applicable,  In consultation   With  Your  Primary Care Providers. *  If   There is  Any  Significant  Worsening   Of  Current  Symptoms  And  Or  If    Any additional  New  Neurological  Symptoms                 Or  Significant  Concerns   Should  Call  911 or  Go  To  Emergency  Department  For  Further  Immediate  Evaluation.

## 2021-01-07 NOTE — PROGRESS NOTES
[facility]  Neurology  [address] [address]  Phone: [phone]   Fax: [phone]    SUBJECTIVE:     PATIENT ID:  Chriss Santamaria is a  RIGHT HANDED 78 y.o. female. Neurologic Problem  The patient's primary symptoms include clumsiness, focal sensory loss, a loss of balance, memory loss and weakness. The patient's pertinent negatives include no altered mental status, focal weakness, near-syncope, slurred speech, syncope or visual change. This is a chronic problem. Episode onset: MORE  THAN   5  YEARS. The neurological problem developed insidiously. The problem has been gradually worsening since onset. There was left-sided, lower extremity and right-sided focality noted. Pertinent negatives include no abdominal pain, auditory change, aura, back pain, bladder incontinence, bowel incontinence, chest pain, confusion, diaphoresis, dizziness, fatigue, fever, headaches, light-headedness, nausea, neck pain, palpitations, shortness of breath, vertigo or vomiting. Past treatments include medication. The treatment provided mild relief. Her past medical history is significant for dementia, head trauma and mood changes. There is no history of a bleeding disorder, a clotting disorder, a CVA, liver disease or seizures. History obtained from  The patient     AND  HER       and other  available medical records were  Also  reviewed. The  Duration,  Quality,  Severity,  Location,  Timing,  Context,  Modifying  Factors   Of   The   Chief   Complaint       And  Present  Illness   Was   Reviewed   In   Chronological   [facility]anner.                                           PATIENT'S  MAIN  CONCERNS INCLUDE :                       1)  CHRONIC     MEMORY  PROBLEMS   AND   DEMENTIA                                    MORE  THAN     10   YEARS                         2)        FAMILY    H/O    DEMENTIA                             3)     H/O    BEING   ENROLLED  IN   DEMENTIA RESEARCH  STUDY                                  AT   [facility]       FOR   5  YEARS  IN  THE PAST                         4)    ON   NAMENDA      FOR   MORE   THAN     5  YEARS                     5)    H/O   BAD  DREAMS   WITH    ARICEPT    IN   THE  PAST                                                          CURRENTLY  ON    ARICEPT  AND  NAMENDA                                         AND  TOLERATING  THE  SAME                                     AS  PER  HER                            6)   H/O   PROGRESSIVE      COGNITIVE  AND  MEMORY  PROBLEMS    GETTING                                        WORSE    SINCE     2017  SLOWLY . CURRENTLY  PATIENT  HAS  SEVERE   DEMENTIA                          7)     H/O    CHRONIC     BALANCE  AND  GAIT    PROBLEMS                                        -    STABLE                            8)       PREVIOUS     H/O      FALL                           AND  MILD   HEAD  INJURY      IN   MAY   2018                          NO  H/O   RECENT  FALLS                             9)    H/O   PULMONARY   EMBOLISM     MORE  THAN    5   YEARS   AGO                             --   ON    XARELTO                           10)    H/O   CHRONIC    ANXIETY,   DEPRESSION                                  -     STABLE                            ON    LEXAPRO                                 TO  FOLLOW  WITH  MENTAL  HEALTH  PROFESSIONALS                         11)    H/O   CHRONIC   DIABETIC  PERIPHERAL  NEUROPATHY                                      -    STABLE                          12)    MULTIPLE  CO  MORBID   MEDICAL   CONDITIONS                               BEING  FOLLOWED     BY     HER  PCP.                                                      13)       HAD NEURO  DIAGNOSTIC  EVALUATIONS                                    IN   SEPT. 2018   WITH    CAROTID,   EEG      WHICH                                     DID NOT  SHOW  SIGNIFICANT 20)     VARIOUS  RISK   FACTORS   WERE  REVIEWED   AND   DISCUSSED. PATIENT   HAS  MULTIPLE   MEDICAL, MENTAL HEALTH                                                NEUROLOGICAL   PROBLEMS . PATIENT'S   MANAGEMENT  IS  CHALLENGING.                                  PRECIPITATING  FACTORS: including  fever/infection, exertion/relaxation, position change, stress, weather     change, medications/alcohol, time of day/darkness/light  Are    Absent                                                               MODIFYING  FACTORS:  fever/infection, exertion/relaxation, position change, stress, weather change,     medications/alcohol, time of day/darkness/light     Are  absent             Patient   Indicates   The  Presence   And  The  Absence  Of  The  Following  Associated  And   Additional  Neurological    Symptoms:                                Balance  And coordination problems  present           Gait problems     present            Headaches      absent              Migraines           absent           Memory problems        Present             Confusion        present            Paresthesia numbness          present           Seizures  And  Starring  Episodes           absent           Syncope,  Near  syncopal episodes         absent           Speech problems           absent             Swallowing  Problems      absent            Dizziness,  Light headedness           absent                        Vertigo        absent             Generalized   Weakness    absent              focal  Weakness     present             Tremors         absent              Sleep  Problems     absent             History  Of   Recent   Head  Injury     absent             History  Of   Recent  TIA     absent             History  Of   Recent    Stroke     absent             Neck  Pain and  Neck muscle  Spasms  Absent               Radiating  down   And Weakness           absent            Lower back   Pain  And     Spasms  Absent              Radiating    Down   And   Weakness          absent                H/O   FALLS        present               History  Of   Visual  Symptoms    Absent                  Associated   Diplopia       absent                                  Also   Additional   Symptoms   Present    As  Documented    In   The detailed      Review  Of  Systems   And    Please   Refer   To    Them for   Additional  Information. Any components  That are either  Unobtainable  Or  Limited  In   HPI, ROS  And/or PFSH       Are   Due   To   Patient's  Medical  Problems,  Clinical  Condition and/or lack of other  Alternate resources.               RECORDS   REVIEWED:    historical medical records         INFORMATION   REVIEWED:     MEDICAL   HISTORY,     SURGICAL   HISTORY,   MEDICATIONS   LIST,   ALLERGIES AND  DRUG  INTOLERANCES,     FAMILY   HISTORY,  SOCIAL  HISTORY,    PROBLEM  LIST   FOR  PATIENT  CARE   COORDINATION        Past Medical History:   Diagnosis Date    Arthritis     Diabetes mellitus ([address] [address] [address]     Essential hypertension     Family history of clotting disorder     Family history of heart disease     History of pulmonary embolism     History of total hip replacement     Hypothyroidism     Mild cognitive impairment     Mitral valve prolapse     Mixed hyperlipidemia     Rheumatoid arthritis (HCC)          Past Surgical History:   Procedure Laterality Date    CERVICAL SPINE SURGERY      HYSTERECTOMY      JOINT REPLACEMENT      MASTOIDECTOMY Right     THYROIDECTOMY      TYMPANOPLASTY Left          Current Outpatient Medications   Medication Sig Dispense Refill    memantine (NAMENDA) 10 MG tablet Take 1 tablet by mouth 2 times daily 60 tablet 5    donepezil (ARICEPT) 5 MG tablet TAKE 1 TABLET DAILY      levothyroxine (SYNTHROID) 75 MCG tablet Take 75 mcg by mouth      rivaroxaban (XARELTO) 20 MG TABS tablet Take Negative for chest pain, palpitations, leg swelling and near-syncope. Gastrointestinal: Negative for abdominal distention, abdominal pain, blood in stool, bowel incontinence, constipation, diarrhea, nausea and vomiting. Endocrine: Negative for cold intolerance, heat intolerance, polydipsia, polyphagia and polyuria. Genitourinary: Negative for bladder incontinence. Musculoskeletal: Positive for gait problem. Negative for arthralgias, back pain, joint swelling, myalgias, neck pain and neck stiffness. Skin: Negative for color change, pallor, rash and wound. Allergic/Immunologic: Negative for environmental allergies, food allergies and immunocompromised state. Neurological: Positive for weakness, numbness and loss of balance. Negative for dizziness, vertigo, tremors, focal weakness, seizures, syncope, facial asymmetry, speech difficulty, light-headedness and headaches. Hematological: Negative for adenopathy. Does not bruise/bleed easily. Psychiatric/Behavioral: Positive for decreased concentration and memory loss. Negative for agitation, behavioral problems, confusion, dysphoric mood, hallucinations, self-injury, sleep disturbance and suicidal ideas. The patient is nervous/anxious. The patient is not hyperactive. OBJECTIVE:    Physical Exam  Constitutional:       Appearance: She is well-developed. HENT:      Head: Normocephalic and atraumatic. No raccoon eyes or Whitaker's sign. Right Ear: External ear normal.      Left Ear: External ear normal.      Nose: Nose normal.   Eyes:      Conjunctiva/sclera: Conjunctivae normal.   Neck:      Musculoskeletal: Normal range of motion and neck supple. Normal range of motion. No neck rigidity or muscular tenderness. Thyroid: No thyroid mass or thyromegaly. Vascular: No carotid bruit. Trachea: No tracheal deviation. Meningeal: Brudzinski's sign and Kernig's sign absent.    Cardiovascular:      Rate and Rhythm: Normal rate and regular rhythm. Pulmonary:      Effort: Pulmonary effort is normal.   Musculoskeletal:         General: No tenderness. Skin:     General: Skin is warm. Coloration: Skin is not pale. Findings: No erythema or rash. Nails: There is no clubbing. Psychiatric:         Attention and Perception: She is attentive. Mood and Affect: Mood is anxious and depressed. Affect is not labile, blunt, angry or inappropriate. Behavior: Behavior is slowed. Behavior is not agitated, aggressive, withdrawn, hyperactive or combative. Behavior is cooperative. Thought Content: Thought content is not paranoid or delusional. Thought content does not include homicidal or suicidal ideation. Thought content does not include homicidal or suicidal plan. Cognition and Memory: Cognition is impaired. Memory is impaired. She exhibits impaired recent memory and impaired remote memory. Judgment: Judgment is not impulsive or inappropriate. NEUROLOGICAL EXAMINATION :      A) MENTAL STATUS:                   Alert and  oriented  To Person. No Aphasia. No  Dysarthria. Able   To  Follow   SIMPLE    commands . No right  To left confusion. SLOW    Speech  And language function. Insight and  Judgment ,Fund  Of  Knowledge   DECREASED                Recent  And  Remote memory  DECREASED                Attention &Concentration are   DECREASED                                                  B) CRANIAL NERVES :             2 CN : Visual  Acuity  And  Visual fields  within normal limits                      Fundi  Could  Not  Be  Could  Not  Be  Evaluated. 3,4,6 CN : Both  Pupils are   Reactive and  Equal.                          Extraocular   Movements  Are  Intact. No  Nystagmus. No  ANTON. No  Afferent  Pupillary  Defect noted.         5 CN :  Normal  Facial sensations and Corneal Reflexes         7 CN :  Normal  Facial  Symmetry  And  Strength. No facial  Weakness. 8 CN :  Hearing  Appears   DECREASED        9, 10 CN: Normal spontaneous, reflex palate movements       11 CN:   Normal  Shoulder shrug and  strength       12 CN :   Normal  Tongue movements and  Tongue  In midline                      No tongue   Fasciculations or atrophy         C) MOTOR  EXAM:                 Strength  In upper  And  Lower extremities   within normal limits,                             EXCEPT    DECREASED   HAND                        Rapid alternating  And  repetitions  Movements  within normal limits               Muscle  Tone  In upper  And  Lower  Extremities  Normal                No rigidity. No  Spasticity. Bradykinesia   absent               No  Asterixis. Sustention  Tremor , Resting  Tremor   absent                    No other  Abnormal  Movements noted           D) SENSORY :               light touch, pinprick, position  And  Vibration  DECREASED      E) REFLEXES:                   Deep  Tendon  Reflexes   DECREASED                    No pathological  Reflexes  Bilaterally.                                   F) COORDINATION  AND  GAIT :                                Station and  Gait   SLOW                                        Romberg's test   POSITIVE                          Ataxia negative          ASSESSMENT:    Patient Active Problem List   Diagnosis    Rheumatoid arthritis ([address]    Mixed hyperlipidemia    Mitral valve prolapse    Hypothyroidism    Family history of heart disease    Family history of clotting disorder    Essential hypertension    Diabetes mellitus ([address]    Arthritis    Late onset Alzheimer's disease without behavioral disturbance ([address]    Anxiety and depression    H/O falling    Confusion    Polyneuropathy associated with underlying disease ([address]    Mild closed head injury    Chronic cerebral ischemia    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       HISTORY:   ORDERING SYSTEM PROVIDED HISTORY: Cerebral infarction due to embolism of   other precerebral artery (HCC)       FINDINGS:       RIGHT:       The right common carotid artery demonstrates peak systolic velocities of 56,   49 cm/sec in the proximal and distal segments respectively.       The right internal carotid artery demonstrates the systolic velocities of 46,   71, 67 cm/sec in the proximal, mid and distal segments respectively.       The external carotid artery is patent.  The vertebral artery demonstrates   normal antegrade flow.       Scattered atherosclerosis.       ICA/CCA ratio of 0.9.           LEFT:       The left common carotid artery demonstrates peak systolic velocities of 66,   59 cm/sec in the proximal and distal segments respectively.       The left internal carotid artery demonstrates the systolic velocities of 43,   87, 82 cm/sec in the proximal, mid and distal segments respectively.       The external carotid artery is patent.  The vertebral artery demonstrates   normal antegrade flow.       Scattered atherosclerosis       ICA/CCA ratio of 0.9.           Impression   The right internal carotid artery demonstrates 0-50% stenosis .       The left internal carotid artery demonstrates 0-50% stenosis .       Bilateral vertebral arteries are patent with flow in the normal direction.               [address] [place], [place]      Ordering physician: Tomasz Belcher    EXAMINATION: CT HEAD W/O CONTRAST  Date: 5/5/2018 7:37 PM  History: Female, 76years old. Ground level fall hit face and head, on blood thinners     COMPARISON:  None. Technique: Noncontrast imaging of the head.      FINDINGS:  Intracranial: No mass effect or midline shift. No CT evidence of acute ischemia or infarction. No abnormal extra-axial collections. No acute intracranial hemorrhage. Negative for hydrocephalus. The basilar cisterns and foramen magnum are preserved. Atrophy/white matter: Age appropriate cerebral volume and white matter. Soft tissues and scalp: There is a right supraorbital scalp hematoma. It measures 44 mm in diameter by 12 mm in thickness. Visualized orbits: Unremarkable. Sinuses and mastoids: Clear sinuses. There has been previous right mastoidectomy. Left mastoid air cells are unremarkable. Bones: The bony calvarium is intact. Nondisplaced nasal fractures are seen. Additional comments: None. IMPRESSION:     1.  No evidence of acute intracranial pathology. 2.  Nondisplaced nasal fractures  3.  Right supraorbital scalp hematoma  4.  Status post right mastoidectomy      [address]      Ordering physician: Robinson Reyes    EXAMINATION: CT CERVICAL SPINE W/O CONTRAST    DATE OF EXAM: 5/5/2018 7:38 PM   CLINICAL INFORMATION: Ground level fall head Head    COMPARISON: None. TECHNIQUE: Multi-detector volumetric/helical data set was obtained through the cervical spine without contrast enhancement and viewed in bone windows. Additional sagittal and coronal reconstructions were also performed. FINDINGS:     Discs: Moderate multilevel spondylosis and degenerative facet arthropathy throughout with disc height loss most pronounced at C5-6 and C6-7. Alignment: The cervical vertebral bodies are in anatomic alignment. Prevertebral soft tissues: normal.     Bones: Odontoid is well seen and unremarkable. There is no evidence of fracture. Surrounding soft tissues: Normal with no evidence of mass, adenopathy, abnormal fluid collection    Lung apices: Clear. Visualized skull base: Right mastoidectomy changes and small right mastoid effusion.     IMPRESSION:    1.  Moderate multilevel spondylosis and degenerative facet arthropathy throughout with disc height loss most pronounced at C5-6 and C6-7.  2.  Right mastoidectomy changes and small right mastoid effusion. WSN:[id number]      Electronically Davonte Baldwin MD  Signed Date/Time:  5/5/2018 8:08:00 PM      VISITING DIAGNOSIS:      ICD-10-CM    1. Late onset Alzheimer's disease without behavioral disturbance (HCC)  G30.1     F02.80    2. 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 Acquired hypothyroidism  E03.9    4. Polyneuropathy associated with underlying disease ([facility] [address]  G63    5. Mixed hyperlipidemia  E78.2    6. Family history of clotting disorder  Z83.2    7. Chronic cerebral ischemia  I67.82    8. Mitral valve prolapse  I34.1    9. Essential hypertension  I10    10. H/O falling  Z91.81    11. Anxiety and depression  F41.9     F32.9    12. Family history of heart disease  Z82.49    13. Arthritis  M19.90    14. Confusion  R41.0    15. Mild closed head injury, sequela  S09.90XS    16. Type 2 diabetes mellitus with diabetic neuropathy, with long-term current use of insulin ([facility])  E11.40     Z79.4    17. Non compliance with medical treatment  Z91.19    18. H/O urinary incontinence  Z87.898               CONCERNS   &   INCREASED   RISK   FOR         * TIA,  CEREBRO  VASCULAR  ISCHEMIA, STROKE      *   COGNITIVE  &   MEMORY PROBLEMS  AND  DEMENTIAS       *   PERIPHERAL  NEUROPATHY,   NEUROPATHIC  PAIN       *  GAIT  DIFFICULTIES  &   BALANCE PROBLMES   AND  FALL                  VARIOUS  RISK   FACTORS   WERE  REVIEWED   AND   DISCUSSED. *  PATIENT   HAS  MULTIPLE   MEDICAL, MENTAL HEALTH            NEUROLOGICAL   PROBLEMS . PATIENT'S   MANAGEMENT  IS  CHALLENGING. PLAN:       Sallie De La Rosa  Of  The  Diagnoses,  The  Management & Treatment  Options           AND    Care  plan  Were        Reviewed and   Discussed   With  patient.          * Goals  And Expectations  Of  The  Therapy  Discussed   And  Reviewed. *   Benefits   And   Side  Effect  Profile  Of  Medication/s   Were   Discussed             * Need   For  Further   Follow up For  The  Various  Problems  Were discussed. * Results  Of  The  Previous  Diagnostic tests were reviewed and questions answered. patient  understand the same. Medical  Decision  Making  Was  Made  Based on the   Complexity  Of  Above  Mentioned  Diagnoses,        Data reviewed   & diagnostic  Tests  Reviewed,  Risk  Of  Significant   Co morbidities and complicating   Factors. Medical  Decision  Was   High  Complexity  Due   To  The  Patient's  Multiple  Symptoms,  Advancing   Disease,      Complex  Treatment  Regimen,  Multiple medications and   Risk  Of   Side  Effects,  Difficulty  In  Medication  Management      And  Diagnostic  Challenges   In  View  Of  The  Associated   Co  Morbid  Conditions   And  Problems. * FALL   PRECAUTIONS. THESE  REVIEWED   AND  DISCUSSED    *  USE   WALKING  ASSISTANCE  DEVICES     QUAD  CANE       *  AVOID  DRIVING         *    SUPERVISED    CARE           *   BE  CAREFUL  WITH  ACTIVITIES  . *   ADEQUATE   FLUID  INTAKE   AND  ELECTROLYTE  BALANCE           * KEEP  DAIRY  OF   THE  NEUROLOGICAL  SYMPTOMS        RECORDING THE    DURATION  AND  FREQUENCY. *  AVOID    CONDITIONS  AND  FACTORS   THAT  MAKE                  NEUROLOGICAL  SYMPTOMS  WORSE. *  TO  MAINTAIN  REGULAR  SLEEP  WAKE  CYCLES.      *   TO  HAVE  ADEQUATE  REST  AND   SLEEP    HOURS.          *    AVOID  ANY USAGE OF                   TOBACCO,  EXCESSIVE  ALCOHOL  AND   ILLEGAL   SUBSTANCES          *  CONTINUE MEDICATIONS PRESCRIBED BY NEUROLOGIST AS    RECOMMENDED     *   Compliance   With  Medications   And  Instructions          *  May   Use  Pill  Box,    If  Needed      *    Prophylactic  Use   Of     Vitamin   B   Complex,  Folic Acid,    Vitamin  B12    Multivitamin,       Calcium  With  magnesium  And  Vit D    Supplementations   Over  The  Counter  Discussed           *  FOOT  CARE, DAILY  INSPECTION  OF  FEET   AND         PERIODIC  PODIATRY EVALUATIONS . *  PATIENT  IS  ALSO   COUNSELED   TO  KEEP    ACTIVITIES:           A)   SIMPLE      B)  ORGANIZED      C)  WRITE   DOWN                          -   REVIEWED    WITH  PATIENT  AND  HER  SPOUSE.                      *  EVALUATIONS  AND  FOLLOW UP:    PERIODICALLY                 * PHYSICAL  THERAPY   / OCCUPATIONAL THERAPY                                * CARDIOLOGY              *  ORTHO                  * RHEUMATOLOGY                                 *     PATIENT'S     INDICATED                                  A)      PATIENT   HAS  BEEN                                         REFUSING    TO  TAKE  HER   MEDICATIONS     SOMETIMES                                       TAKES  ONLY     HALF OF  THE  TIMES. B)       PATIENT   ALSO  BECOMING  INCONTINENT                          *         PATIENT     ADVISED   &   RECOMMENDED                                A)   SUPERVISED    NURSING   CARE      REGULARLY                                 B)    HELP  WITH   ACTIVITIES OF  DAILY  LIVING                                C)     FALL  PRECAUTIONS                                  D)     NURSING   HOME  PLACEMENT     TO      REDUCE                                               MORBIDITY     IN  VIEW  OF   HER  SEVERE  DEMENTIA                                       -    ABOVE  REVIEWED   AND  DISCUSSED   WITH  PATIENT                                              AND  HER     IN  DETAIL.                             Orders Placed This Encounter   Medications    memantine (NAMENDA) 10 MG tablet     Sig: Take 1 tablet by mouth 2 times daily     Dispense:  60 tablet     Refill:  5                                              *PATIENT   TO  FOLLOW UP  WITH   PRIMARY  CARE            AND   OTHER  CONSULTANTS  AS  BEFORE.             *TO  FOLLOW  WITH   MENTAL  HEALTH  PROFESSIONALS ,  INCLUDING            PSYCHOLOGICAL  COUNSELING   AND  PSYCHIATRIC  EVALUTIONS,                  *  Maintain   Healthy  Life Style    With   Periodic  Monitoring  Of      Any  Medical  Conditions  Including   Elevated  Blood  Pressure,  Lipid  Profile,     Blood  Sugar levels  And   Heart  Disease. *   Period   Screening  For  Cancers  Involving  Breast,  Colon,    lungs  And  Other  Organs  As  Applicable,  In consultation   With  Your  Primary Care Providers. * Second  Neurological  Opinion  And  Evaluations  In  [facility]  Setting  If  Patient  Is  Interested. * Please   Contact   Neurology  Clinic   Early   If   Are  Any  New  Neurological                             Symptoms   And  Any neurological  Concerns. *  If  The  Patient remains  Neurologically  Stable   Return   To  [facility] Neurology Department   IN  6   MONTHS  TIME   FOR  FURTHER              FOLLOW UP.                     *  If   There is  Any  Significant  Worsening   Of  Current  Symptoms  And  Or  If patient  Develops       Any additional  New  Neurological  Symptoms  Or  Significant  Concerns       Should  Call  911 or  Go  To  Emergency  Department  For  Further  Immediate  Evaluation. *   The  Neurological   Findings,  Possible  Diagnosis,  Differential diagnoses       And  Options  For    Further   Investigations   And  management   Are  Discussed  Comprehensively. Medications   And  Prescription   Risks  And  Side effects  Are   Also  Discussed. The  Above  Were  Reviewed  With  patient and   HER           questions  Answered  In  Detail.               More   Than   50% of face  To face Time   Was  Spent  On  Counseling   And   Coordination  Of  Care       Of Patient's multiple   Neurological  Problems   And   Comorbid  Medical   Conditions. Electronically signed by Clementina Johnson MD.,  [facility]       Board Certified in  Neurology &  In  [facility] [employer] ([employer])      DISCLAIMER:   Although every effort was made to ensure the accuracy of this  electronic transcription, some errors in transcription may have occurred. GENERAL PATIENT INSTRUCTIONS:     A Healthy Lifestyle: Care Instructions  Your Care Instructions  A healthy lifestyle can help you feel good, stay at a healthy weight, and have plenty of energy for both work and play. A healthy lifestyle is something you can share with your whole family. A healthy lifestyle also can lower your risk for serious health problems, such as high blood pressure, heart disease, and diabetes. You can follow a few steps listed below to improve your health and the health of your family. Follow-up care is a key part of your treatment and safety. Be sure to make and go to all appointments, and call your doctor if you are having problems. Its also a good idea to know your test results and keep a list of the medicines you take. How can you care for yourself at home? Do not eat too much sugar, fat, or fast foods. You can still have dessert and treats now and then. The goal is moderation. Start small to improve your eating habits. Pay attention to portion sizes, drink less juice and soda pop, and eat more fruits and vegetables. Eat a healthy amount of food. A 3-ounce serving of meat, for example, is about the size of a deck of cards. Fill the rest of your plate with vegetables and whole grains. Limit the amount of soda and sports drinks you have every day. Drink more water when you are thirsty. Eat at least 5 servings of fruits and vegetables every day.  It may seem like a lot, but it is not hard to reach this goal. A serving or helping is 1 piece of fruit, 1 cup of can be more active. Anything that makes you breathe hard and gets your heart pumping is exercise. Here are some tips:  Walk to do errands or to take your child to school or the bus. Go for a family bike ride after dinner instead of watching TV. Where can you learn more? Go to https://chpepiceweb.[employer]. org and sign in to your [employer] account. Enter A866 in the [employer] box to learn more about \"A Healthy Lifestyle: Care Instructions. \"     If you do not have an account, please click on the \"Sign Up Now\" link. Current as of: July 26, 2016  Content Version: 11.2  © 5436-7339 [employer]. Care instructions adapted under license by [facility] ([facility]). If you have questions about a medical condition or this instruction, always ask your healthcare professional. [address] any warranty or liability for your use of this information.

## 2021-01-12 RX ORDER — MEMANTINE HYDROCHLORIDE 10 MG/1
10 TABLET ORAL 2 TIMES DAILY
Qty: 60 TABLET | Refills: 5 | Status: SHIPPED | OUTPATIENT
Start: 2021-01-12

## 2021-07-08 ENCOUNTER — OFFICE VISIT (OUTPATIENT)
Dept: NEUROLOGY | Age: 81
End: 2021-07-08
Payer: MEDICARE

## 2021-07-08 VITALS
BODY MASS INDEX: 23.19 KG/M2 | HEIGHT: 68 IN | WEIGHT: 153 LBS | DIASTOLIC BLOOD PRESSURE: 70 MMHG | SYSTOLIC BLOOD PRESSURE: 120 MMHG | HEART RATE: 61 BPM | OXYGEN SATURATION: 97 %

## 2021-07-08 DIAGNOSIS — I63.19 CEREBRAL INFARCTION DUE TO EMBOLISM OF OTHER PRECEREBRAL ARTERY (HCC): ICD-10-CM

## 2021-07-08 DIAGNOSIS — Z91.199 NON COMPLIANCE WITH MEDICAL TREATMENT: ICD-10-CM

## 2021-07-08 DIAGNOSIS — F32.A ANXIETY AND DEPRESSION: ICD-10-CM

## 2021-07-08 DIAGNOSIS — M05.732 RHEUMATOID ARTHRITIS INVOLVING BOTH WRISTS WITH POSITIVE RHEUMATOID FACTOR (HCC): ICD-10-CM

## 2021-07-08 DIAGNOSIS — F41.9 ANXIETY AND DEPRESSION: ICD-10-CM

## 2021-07-08 DIAGNOSIS — E03.9 HYPOTHYROIDISM, UNSPECIFIED TYPE: ICD-10-CM

## 2021-07-08 DIAGNOSIS — F02.80 LATE ONSET ALZHEIMER'S DISEASE WITHOUT BEHAVIORAL DISTURBANCE (HCC): Primary | ICD-10-CM

## 2021-07-08 DIAGNOSIS — G63 POLYNEUROPATHY ASSOCIATED WITH UNDERLYING DISEASE (HCC): ICD-10-CM

## 2021-07-08 DIAGNOSIS — I63.00 CEREBRAL INFARCTION DUE TO THROMBOSIS OF PRECEREBRAL ARTERY (HCC): ICD-10-CM

## 2021-07-08 DIAGNOSIS — E11.40 TYPE 2 DIABETES MELLITUS WITH DIABETIC NEUROPATHY, WITH LONG-TERM CURRENT USE OF INSULIN (HCC): ICD-10-CM

## 2021-07-08 DIAGNOSIS — M05.731 RHEUMATOID ARTHRITIS INVOLVING BOTH WRISTS WITH POSITIVE RHEUMATOID FACTOR (HCC): ICD-10-CM

## 2021-07-08 DIAGNOSIS — Z91.81 H/O FALLING: ICD-10-CM

## 2021-07-08 DIAGNOSIS — R41.0 CONFUSION: ICD-10-CM

## 2021-07-08 DIAGNOSIS — Z79.4 TYPE 2 DIABETES MELLITUS WITH DIABETIC NEUROPATHY, WITH LONG-TERM CURRENT USE OF INSULIN (HCC): ICD-10-CM

## 2021-07-08 DIAGNOSIS — G30.1 LATE ONSET ALZHEIMER'S DISEASE WITHOUT BEHAVIORAL DISTURBANCE (HCC): Primary | ICD-10-CM

## 2021-07-08 DIAGNOSIS — I67.82 CHRONIC CEREBRAL ISCHEMIA: ICD-10-CM

## 2021-07-08 DIAGNOSIS — Z87.898 H/O URINARY INCONTINENCE: ICD-10-CM

## 2021-07-08 DIAGNOSIS — S09.90XS MILD CLOSED HEAD INJURY, SEQUELA: ICD-10-CM

## 2021-07-08 DIAGNOSIS — E03.9 ACQUIRED HYPOTHYROIDISM: ICD-10-CM

## 2021-07-08 PROCEDURE — 99213 OFFICE O/P EST LOW 20 MIN: CPT | Performed by: PSYCHIATRY & NEUROLOGY

## 2021-07-08 PROCEDURE — 99214 OFFICE O/P EST MOD 30 MIN: CPT | Performed by: PSYCHIATRY & NEUROLOGY

## 2021-07-08 ASSESSMENT — ENCOUNTER SYMPTOMS
SHORTNESS OF BREATH: 0
BLOOD IN STOOL: 0
ABDOMINAL DISTENTION: 0
VISUAL CHANGE: 0
TROUBLE SWALLOWING: 0
APNEA: 0
BACK PAIN: 0
ABDOMINAL PAIN: 0
SORE THROAT: 0
BOWEL INCONTINENCE: 0
VOMITING: 0
VOICE CHANGE: 0
DIARRHEA: 0
SINUS PRESSURE: 0
COLOR CHANGE: 0
COUGH: 0
PHOTOPHOBIA: 0
NAUSEA: 0
EYE REDNESS: 0
CHOKING: 0
EYE DISCHARGE: 0
CONSTIPATION: 0
EYE PAIN: 0
WHEEZING: 0
EYE ITCHING: 0
CHEST TIGHTNESS: 0
FACIAL SWELLING: 0

## 2021-07-08 NOTE — PROGRESS NOTES
[facility]  Neurology  [address] [place] [address]  Phone: [phone]   Fax: [phone]      SUBJECTIVE:     PATIENT ID:  Tyrell Rubinstein is a  RIGHT HANDED 78 y.o. female. Neurologic Problem  The patient's primary symptoms include clumsiness, focal sensory loss, a loss of balance, memory loss and weakness. The patient's pertinent negatives include no altered mental status, focal weakness, near-syncope, slurred speech, syncope or visual change. This is a chronic problem. Episode onset: MORE  THAN   5  YEARS. The neurological problem developed insidiously. The problem has been gradually worsening since onset. There was left-sided, lower extremity and right-sided focality noted. Pertinent negatives include no abdominal pain, auditory change, aura, back pain, bladder incontinence, bowel incontinence, chest pain, confusion, diaphoresis, dizziness, fatigue, fever, headaches, light-headedness, nausea, neck pain, palpitations, shortness of breath, vertigo or vomiting. Past treatments include medication. The treatment provided mild relief. Her past medical history is significant for dementia, head trauma and mood changes. There is no history of a bleeding disorder, a clotting disorder, a CVA, liver disease or seizures. History obtained from  The patient     AND  HER       and other  available medical records were  Also  reviewed. The  Duration,  Quality,  Severity,  Location,  Timing,  Context,  Modifying  Factors   Of   The   Chief   Complaint       And  Present  Illness   Was   Reviewed   In   Chronological   Manner.                                           PATIENT'S  MAIN  CONCERNS INCLUDE :                       1)  CHRONIC     MEMORY  PROBLEMS   AND   DEMENTIA                                    MORE  THAN     10   YEARS                         2)        FAMILY    H/O    DEMENTIA                             3)     H/O    BEING   ENROLLED  IN   DEMENTIA RESEARCH  STUDY                                  AT   [address] [place] [address]   5  YEARS  IN  THE PAST                         4)    ON   NAMENDA      FOR   MORE   THAN     5  YEARS                     5)    H/O   BAD  DREAMS   WITH    ARICEPT    IN   THE  PAST                                                                        6)   H/O   PROGRESSIVE     COGNITIVE  AND  MEMORY  PROBLEMS    GETTING                                        WORSE    SINCE     2017  SLOWLY . 7)     H/O    CHRONIC     BALANCE  AND  GAIT    PROBLEMS                                        -    STABLE                            8)       PREVIOUS     H/O      FALL                           AND  MILD   HEAD  INJURY      IN   MAY   2018                          NO  H/O   RECENT  FALLS                             9)    H/O   PULMONARY   EMBOLISM     MORE  THAN    5   YEARS   AGO                             --   ON    XARELTO                           10)    H/O   CHRONIC    ANXIETY,   DEPRESSION                                  -     STABLE                            ON    LEXAPRO                                 TO  FOLLOW  WITH  MENTAL  HEALTH  PROFESSIONALS                         11)    H/O   CHRONIC   DIABETIC  PERIPHERAL  NEUROPATHY                                      -    STABLE                          12)    MULTIPLE  CO  MORBID   MEDICAL   CONDITIONS                               BEING  FOLLOWED     BY     HER  PCP.                                                      13)       HAD NEURO  DIAGNOSTIC  EVALUATIONS                                    IN   SEPT. 2018   WITH    CAROTID,   EEG      WHICH                                     DID NOT  SHOW  SIGNIFICANT  ABNORMALITIES                                    TCD   ABNORMAL    AND  PATIENT  ALREADY  ON   XARELTO                                         FOR  CARDIOPULMONARY      REASONS.                                14)      PREVIOUS    H/O Spasms  Absent               Radiating  down   And   Weakness           absent            Lower back   Pain  And     Spasms  Absent              Radiating    Down   And   Weakness          absent                H/O   FALLS        present               History  Of   Visual  Symptoms    Absent                  Associated   Diplopia       absent                                  Also   Additional   Symptoms   Present    As  Documented    In   The detailed      Review  Of  Systems   And    Please   Refer   To    Them for   Additional  Information. Any components  That are either  Unobtainable  Or  Limited  In   HPI, ROS  And/or PFSH       Are   Due   To   Patient's  Medical  Problems,  Clinical  Condition and/or lack of other  Alternate resources.               RECORDS   REVIEWED:    historical medical records         INFORMATION   REVIEWED:     MEDICAL   HISTORY,     SURGICAL   HISTORY,   MEDICATIONS   LIST,   ALLERGIES AND  DRUG  INTOLERANCES,     FAMILY   HISTORY,  SOCIAL  HISTORY,    PROBLEM  LIST   FOR  PATIENT  CARE   COORDINATION        Past Medical History:   Diagnosis Date    Arthritis     Diabetes mellitus ([address]     Essential hypertension     Family history of clotting disorder     Family history of heart disease     History of pulmonary embolism     History of total hip replacement     Hypothyroidism     Mild cognitive impairment     Mitral valve prolapse     Mixed hyperlipidemia     Rheumatoid arthritis (HCC)          Past Surgical History:   Procedure Laterality Date    CERVICAL SPINE SURGERY      HYSTERECTOMY      JOINT REPLACEMENT      MASTOIDECTOMY Right     THYROIDECTOMY      TYMPANOPLASTY Left          Current Outpatient Medications   Medication Sig Dispense Refill    memantine (NAMENDA) 10 MG tablet Take 1 tablet by mouth 2 times daily 60 tablet 5    donepezil (ARICEPT) 5 MG tablet TAKE 1 TABLET DAILY      levothyroxine (SYNTHROID) 75 MCG tablet Take 75 mcg by mouth  rivaroxaban (XARELTO) 20 MG TABS tablet Take 20 mg by mouth daily (with breakfast)      ATENOLOL PO Take 75 mg by mouth Take 1 1/2 pills in am      omeprazole (PRILOSEC) 20 MG delayed release capsule Take 20 mg by mouth 2 times daily      simvastatin (ZOCOR) 40 MG tablet Take 40 mg by mouth every morning      escitalopram (LEXAPRO) 10 MG tablet Take 10 mg by mouth daily      vitamin B-12 (CYANOCOBALAMIN) 1000 MCG tablet Take 1,000 mcg by mouth daily      Cholecalciferol (VITAMIN D-3) 1000 units CAPS Take by mouth daily      polycarbophil (FIBERCON) 625 MG tablet Take 625 mg by mouth daily      metFORMIN (GLUCOPHAGE) 500 MG tablet Take 500 mg by mouth 2 times daily (with meals)      DiphenhydrAMINE HCl (BENADRYL ALLERGY PO) Take by mouth as needed      Multiple Vitamins-Minerals (MULTIVITAMIN ADULT PO) Take by mouth (Patient not taking: Reported on 2021)      sulfamethoxazole-trimethoprim (BACTRIM DS;SEPTRA DS) 800-160 MG per tablet Take 1 tablet by mouth as needed (Patient not taking: Reported on 2021)       No current facility-administered medications for this visit.          Allergies   Allergen Reactions    Codeine Nausea Only    Coumadin [Warfarin] Other (See Comments)     Could not stabalize    Fosamax [Alendronate] Nausea Only    Mercuric Bichloride Hives    Morphine Sulfate [Morphine] Nausea Only         Family History   Problem Relation Age of Onset    Diabetes Mother     Heart Disease Mother     Heart Disease Father     Cancer Sister     Cancer Brother          Social History     Socioeconomic History    Marital status:      Spouse name: Not on file    Number of children: Not on file    Years of education: Not on file    Highest education level: Not on file   Occupational History    Not on file   Tobacco Use    Smoking status: Former Smoker     Quit date: 1994     Years since quittin.4    Smokeless tobacco: Never Used   Vaping Use    Vaping Use: Never used   Substance and Sexual Activity    Alcohol use: Yes     Alcohol/week: 1.0 standard drinks     Types: 1 Glasses of wine per week     Comment: 1 glass occasionally    Drug use: No    Sexual activity: Yes     Partners: Male   Other Topics Concern    Not on file   Social History Narrative    Not on file     Social Determinants of Health     Financial Resource Strain:     Difficulty of Paying Living Expenses:    Food Insecurity:     Worried About Running Out of Food in the Last Year:     [address] in the Last Year:    Transportation Needs:     Lack of Transportation (Medical):  Lack of Transportation (Non-Medical):    Physical Activity:     Days of Exercise per Week:     Minutes of Exercise per Session:    Stress:     Feeling of Stress :    Social Connections:     Frequency of Communication with Friends and Family:     Frequency of Social Gatherings with Friends and Family:     Attends Synagogue Services:     Active Member of Clubs or Organizations:     Attends Club or Organization Meetings:     Marital Status:    Intimate Partner Violence:     Fear of Current or Ex-Partner:     Emotionally Abused:     Physically Abused:     Sexually Abused:        Vitals:    07/08/21 1453   BP: 120/70   Pulse: 61   SpO2: 97%         Wt Readings from Last 3 Encounters:   07/08/21 153 lb (69.4 kg)   01/07/21 158 lb (71.7 kg)   06/12/20 167 lb 3.2 oz (75.8 kg)         BP Readings from Last 3 Encounters:   07/08/21 120/70   01/07/21 126/80   06/12/20 114/64         Review of Systems   Constitutional: Negative for appetite change, chills, diaphoresis, fatigue, fever and unexpected weight change. HENT: Negative for congestion, dental problem, drooling, ear discharge, ear pain, facial swelling, hearing loss, mouth sores, nosebleeds, postnasal drip, sinus pressure, sore throat, tinnitus, trouble swallowing and voice change.     Eyes: Negative for photophobia, pain, discharge, redness, itching and visual disturbance. Respiratory: Negative for apnea, cough, choking, chest tightness, shortness of breath and wheezing. Cardiovascular: Negative for chest pain, palpitations, leg swelling and near-syncope. Gastrointestinal: Negative for abdominal distention, abdominal pain, blood in stool, bowel incontinence, constipation, diarrhea, nausea and vomiting. Endocrine: Negative for cold intolerance, heat intolerance, polydipsia, polyphagia and polyuria. Genitourinary: Negative for bladder incontinence. Musculoskeletal: Positive for gait problem. Negative for arthralgias, back pain, joint swelling, myalgias, neck pain and neck stiffness. Skin: Negative for color change, pallor, rash and wound. Allergic/Immunologic: Negative for environmental allergies, food allergies and immunocompromised state. Neurological: Positive for weakness, numbness and loss of balance. Negative for dizziness, vertigo, tremors, focal weakness, seizures, syncope, facial asymmetry, speech difficulty, light-headedness and headaches. Hematological: Negative for adenopathy. Does not bruise/bleed easily. Psychiatric/Behavioral: Positive for decreased concentration and memory loss. Negative for agitation, behavioral problems, confusion, dysphoric mood, hallucinations, self-injury, sleep disturbance and suicidal ideas. The patient is nervous/anxious. The patient is not hyperactive. OBJECTIVE:    Physical Exam  Constitutional:       Appearance: She is well-developed. HENT:      Head: Normocephalic and atraumatic. No raccoon eyes or Whitaker's sign. Right Ear: External ear normal.      Left Ear: External ear normal.      Nose: Nose normal.   Eyes:      Conjunctiva/sclera: Conjunctivae normal.   Neck:      Thyroid: No thyroid mass or thyromegaly. Vascular: No carotid bruit. Trachea: No tracheal deviation. Meningeal: Brudzinski's sign and Kernig's sign absent.    Cardiovascular:      Rate and Rhythm: Normal rate and regular rhythm. Pulmonary:      Effort: Pulmonary effort is normal.   Musculoskeletal:         General: No tenderness. Cervical back: Normal range of motion and neck supple. No rigidity. No muscular tenderness. Normal range of motion. Skin:     General: Skin is warm. Coloration: Skin is not pale. Findings: No erythema or rash. Nails: There is no clubbing. Psychiatric:         Attention and Perception: She is attentive. Mood and Affect: Mood is anxious and depressed. Affect is not labile, blunt, angry or inappropriate. Behavior: Behavior is slowed. Behavior is not agitated, aggressive, withdrawn, hyperactive or combative. Behavior is cooperative. Thought Content: Thought content is not paranoid or delusional. Thought content does not include homicidal or suicidal ideation. Thought content does not include homicidal or suicidal plan. Cognition and Memory: Cognition is impaired. Memory is impaired. She exhibits impaired recent memory and impaired remote memory. Judgment: Judgment is not impulsive or inappropriate. NEUROLOGICAL EXAMINATION :      A) MENTAL STATUS:                   Alert and  oriented  To Person. No Aphasia. No  Dysarthria. Able   To  Follow   SIMPLE    commands . No right  To left confusion. SLOW    Speech  And language function. Insight and  Judgment ,Fund  Of  Knowledge   DECREASED                Recent  And  Remote memory  DECREASED                Attention &Concentration are   DECREASED                                                  B) CRANIAL NERVES :             2 CN : Visual  Acuity  And  Visual fields  within normal limits                        Fundi  Could  Not  Be  Could  Not  Be  Evaluated. 3,4,6 CN : Both  Pupils are   Reactive and  Equal.                            Extraocular   Movements  Are  Intact. No  Nystagmus. No  ANTON. No  Afferent  Pupillary  Defect noted. 5 CN :  Normal  Facial sensations and Corneal  Reflexes           7 CN :  Normal  Facial  Symmetry  And  Strength. No facial  Weakness. 8 CN :  Hearing  Appears   DECREASED          9, 10 CN: Normal spontaneous, reflex palate movements         11 CN:   Normal  Shoulder shrug and  Strength         12 CN :   Normal  Tongue movements and  Tongue  In midline                        No tongue   Fasciculations or atrophy         C) MOTOR  EXAM:                 Strength  In upper  And  Lower extremities   within normal limits,                             EXCEPT    DECREASED   HAND                        Rapid alternating  And  repetitions  Movements  within normal limits               Muscle  Tone  In upper  And  Lower  Extremities  Normal                No rigidity. No  Spasticity. Bradykinesia   absent               No  Asterixis. Sustention  Tremor , Resting  Tremor   absent                    No other  Abnormal  Movements noted           D) SENSORY :               light touch, pinprick, position  And  Vibration  DECREASED      E) REFLEXES:                   Deep  Tendon  Reflexes   DECREASED                    No pathological  Reflexes  Bilaterally.                                   F) COORDINATION  AND  GAIT :                                Station and  Gait   SLOW                                        Romberg's test   POSITIVE                          Ataxia negative          ASSESSMENT:      Patient Active Problem List   Diagnosis    Rheumatoid arthritis ([address]    Mixed hyperlipidemia    Mitral valve prolapse    Hypothyroidism    Family history of heart disease    Family history of clotting disorder    Essential hypertension    Diabetes mellitus ([address]    Arthritis    Late onset Alzheimer's disease without behavioral disturbance ([address]    Anxiety and depression fall hit face and head, on blood thinners     COMPARISON:  None. Technique: Noncontrast imaging of the head. FINDINGS:  Intracranial: No mass effect or midline shift. No CT evidence of acute ischemia or infarction. No abnormal extra-axial collections. No acute intracranial hemorrhage. Negative for hydrocephalus. The basilar cisterns and foramen magnum are preserved. Atrophy/white matter: Age appropriate cerebral volume and white matter. Soft tissues and scalp: There is a right supraorbital scalp hematoma. It measures 44 mm in diameter by 12 mm in thickness. Visualized orbits: Unremarkable. Sinuses and mastoids: Clear sinuses. There has been previous right mastoidectomy. Left mastoid air cells are unremarkable. Bones: The bony calvarium is intact. Nondisplaced nasal fractures are seen. Additional comments: None. IMPRESSION:     1.  No evidence of acute intracranial pathology. 2.  Nondisplaced nasal fractures  3.  Right supraorbital scalp hematoma  4.  Status post right mastoidectomy      [address] [facility]      Ordering physician: Eduardo Skaggs    EXAMINATION: CT CERVICAL SPINE W/O CONTRAST    DATE OF EXAM: 5/5/2018 7:38 PM   CLINICAL INFORMATION: Ground level fall head Head    COMPARISON: None. TECHNIQUE: Multi-detector volumetric/helical data set was obtained through the cervical spine without contrast enhancement and viewed in bone windows. Additional sagittal and coronal reconstructions were also performed. FINDINGS:     Discs: Moderate multilevel spondylosis and degenerative facet arthropathy throughout with disc height loss most pronounced at C5-6 and C6-7. Alignment: The cervical vertebral bodies are in anatomic alignment. Prevertebral soft tissues: normal.     Bones: Odontoid is well seen and unremarkable. There is no evidence of fracture.     Surrounding soft tissues: Normal with no evidence of mass, adenopathy, abnormal fluid collection    Lung apices: Clear. Visualized skull base: Right mastoidectomy changes and small right mastoid effusion. IMPRESSION:    1.  Moderate multilevel spondylosis and degenerative facet arthropathy throughout with disc height loss most pronounced at C5-6 and C6-7.  2.  Right mastoidectomy changes and small right mastoid effusion. WSN:[id number]      Electronically Jose Juan Barbosa MD  Signed Date/Time:  5/5/2018 8:08:00 PM      VISITING DIAGNOSIS:      ICD-10-CM    1. Late onset Alzheimer's disease without behavioral disturbance (HCC)  G30.1     F02.80    2. Non compliance with medical treatment  Z91.19    3. Acquired hypothyroidism  E03.9    4. H/O urinary incontinence  Z87.898    5. Polyneuropathy associated with underlying disease ([facility]  G63    6. Chronic cerebral ischemia  I67.82    7. 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 H/O falling  Z91.81    9. Anxiety and depression  F41.9     F32.9    10. Cerebral infarction due to thrombosis of precerebral artery ([facility])   I63.00    11. Confusion  R41.0    12. Rheumatoid arthritis involving both wrists with positive rheumatoid factor ([facility]  M05.731     M05.732    13. Type 2 diabetes mellitus with diabetic neuropathy, with long-term current use of insulin ([facility])  E11.40     Z79.4    14. Mild closed head injury, sequela  S09.90XS    15. Hypothyroidism, unspecified type  E03.9               CONCERNS   &   INCREASED   RISK   FOR         * TIA,  CEREBRO  VASCULAR  ISCHEMIA, STROKE      *   COGNITIVE  &   MEMORY PROBLEMS  AND  DEMENTIAS       *   PERIPHERAL  NEUROPATHY,   NEUROPATHIC  PAIN       *  GAIT  DIFFICULTIES  &   BALANCE PROBLMES   AND  FALL                  VARIOUS  RISK   FACTORS   WERE  REVIEWED   AND   DISCUSSED. *  PATIENT   HAS  MULTIPLE   MEDICAL, MENTAL HEALTH            NEUROLOGICAL   PROBLEMS . PATIENT'S   MANAGEMENT  IS  CHALLENGING.              PLAN:       Maris Turner Of  The  Diagnoses,  The  Management & Treatment  Options           AND    Care  plan  Were        Reviewed and   Discussed   With  patient. * Goals  And  Expectations  Of  The  Therapy  Discussed   And  Reviewed. *   Benefits   And   Side  Effect  Profile  Of  Medication/s   Were   Discussed             * Need   For  Further   Follow up For  The  Various  Problems  Were discussed. * Results  Of  The  Previous  Diagnostic tests were reviewed and questions answered. patient  understand the same. Medical  Decision  Making  Was  Made  Based on the   Complexity  Of  Above  Mentioned  Diagnoses,        Data reviewed   & diagnostic  Tests  Reviewed,  Risk  Of  Significant   Co morbidities and complicating   Factors. Medical  Decision  Was   High  Complexity  Due   To  The  Patient's  Multiple  Symptoms,  Advancing   Disease,      Complex  Treatment  Regimen,  Multiple medications and   Risk  Of   Side  Effects,  Difficulty  In  Medication  Management      And  Diagnostic  Challenges   In  View  Of  The  Associated   Co  Morbid  Conditions   And  Problems. * FALL   PRECAUTIONS. THESE  REVIEWED   AND  DISCUSSED    *  USE   WALKING  ASSISTANCE  DEVICES     QUAD  CANE       *  AVOID  DRIVING         *    SUPERVISED    CARE           *   BE  CAREFUL  WITH  ACTIVITIES  . *   ADEQUATE   FLUID  INTAKE   AND  ELECTROLYTE  BALANCE           * KEEP  DAIRY  OF   THE  NEUROLOGICAL  SYMPTOMS        RECORDING THE    DURATION  AND  FREQUENCY. *  AVOID    CONDITIONS  AND  FACTORS   THAT  MAKE                  NEUROLOGICAL  SYMPTOMS  WORSE. *  TO  MAINTAIN  REGULAR  SLEEP  WAKE  CYCLES.      *   TO  HAVE  ADEQUATE  REST  AND   SLEEP    HOURS.          *    AVOID  ANY USAGE OF                   TOBACCO,  EXCESSIVE  ALCOHOL  AND   ILLEGAL   SUBSTANCES          *  CONTINUE MEDICATIONS PRESCRIBED BY NEUROLOGIST AS    RECOMMENDED     * Compliance   With  Medications   And  Instructions          *  May   Use  Pill  Box,    If  Needed      *    Prophylactic  Use   Of     Vitamin   B   Complex,  Folic  Acid,    Vitamin  B12    Multivitamin,       Calcium  With  magnesium  And  Vit D    Supplementations   Over  The  Counter  Discussed           *  FOOT  CARE, DAILY  INSPECTION  OF  FEET   AND         PERIODIC  PODIATRY EVALUATIONS . *  PATIENT  IS  ALSO   COUNSELED   TO  KEEP    ACTIVITIES:           A)   SIMPLE      B)  ORGANIZED      C)  WRITE   DOWN                          -   REVIEWED    WITH  PATIENT  AND  HER  SPOUSE.                      *  EVALUATIONS  AND  FOLLOW UP:    PERIODICALLY                 * PHYSICAL  THERAPY   / OCCUPATIONAL THERAPY                                * CARDIOLOGY              *  ORTHO                  * RHEUMATOLOGY                               *         PATIENT   RE  ADVISED   &   RECOMMENDED                                A)   SUPERVISED    NURSING   CARE      REGULARLY                                 B)    HELP  WITH   ACTIVITIES OF  DAILY  LIVING                                             INCLUDING  PERSONAL  HYGIENE                                C)     FALL  PRECAUTIONS                                  D)     NURSING   HOME  PLACEMENT     TO      REDUCE                                               MORBIDITY     IN  VIEW  OF   HER  SEVERE  DEMENTIA                                       -    ABOVE  REVIEWED   AND  DISCUSSED   WITH  PATIENT                                              AND  HER     IN  DETAIL.                                                                *PATIENT   TO  FOLLOW  UP  WITH   PRIMARY  CARE            AND   OTHER  CONSULTANTS  AS  BEFORE.             *TO  FOLLOW  WITH   MENTAL  HEALTH  PROFESSIONALS ,  INCLUDING            PSYCHOLOGICAL  COUNSELING   AND  PSYCHIATRIC  EVALUTIONS,                  *  Maintain   Healthy  Life Style    With   Periodic  Monitoring  Of Any  Medical  Conditions  Including   Elevated  Blood  Pressure,  Lipid  Profile,     Blood  Sugar levels  And   Heart  Disease. *   Period   Screening  For  Cancers  Involving  Breast,  Colon,    lungs  And  Other  Organs  As  Applicable,  In consultation   With  Your  Primary Care Providers. * Second  Neurological  Opinion  And  Evaluations  In  [facility]  Setting  If  Patient  Is  Interested. * Please   Contact   Neurology  Clinic   Early   If   Are  Any  New  Neurological                             Symptoms   And  Any neurological  Concerns. *  If  The  Patient remains  Neurologically  Stable   Return   To  [facility] Neurology Department   IN  6   MONTHS  TIME   FOR  FURTHER              FOLLOW UP.                     *  If   There is  Any  Significant  Worsening   Of  Current  Symptoms  And  Or  If patient  Develops       Any additional  New  Neurological  Symptoms  Or  Significant  Concerns       Should  Call  911 or  Go  To  Emergency  Department  For  Further  Immediate  Evaluation. *   The  Neurological   Findings,  Possible  Diagnosis,  Differential diagnoses       And  Options  For    Further   Investigations   And  management   Are  Discussed  Comprehensively. Medications   And  Prescription   Risks  And  Side effects  Are   Also  Discussed. The  Above  Were  Reviewed  With  patient and   HER           questions  Answered  In  Detail. More   Than   50% of face  To face Time   Was  Spent  On  Counseling   And   Coordination  Of  Care       Of   Patient's multiple   Neurological  Problems   And   Comorbid  Medical   Conditions.               Electronically signed by Rito Lobo MD.,  [facility]       Board Certified in  Neurology &  In  [employer] [facility] [employer] ([employer])      DISCLAIMER:   Although every effort was made to ensure the accuracy of this  electronic transcription, some errors in transcription may have occurred. GENERAL PATIENT INSTRUCTIONS:     A Healthy Lifestyle: Care Instructions  Your Care Instructions  A healthy lifestyle can help you feel good, stay at a healthy weight, and have plenty of energy for both work and play. A healthy lifestyle is something you can share with your whole family. A healthy lifestyle also can lower your risk for serious health problems, such as high blood pressure, heart disease, and diabetes. You can follow a few steps listed below to improve your health and the health of your family. Follow-up care is a key part of your treatment and safety. Be sure to make and go to all appointments, and call your doctor if you are having problems. Its also a good idea to know your test results and keep a list of the medicines you take. How can you care for yourself at home? Do not eat too much sugar, fat, or fast foods. You can still have dessert and treats now and then. The goal is moderation. Start small to improve your eating habits. Pay attention to portion sizes, drink less juice and soda pop, and eat more fruits and vegetables. Eat a healthy amount of food. A 3-ounce serving of meat, for example, is about the size of a deck of cards. Fill the rest of your plate with vegetables and whole grains. Limit the amount of soda and sports drinks you have every day. Drink more water when you are thirsty. Eat at least 5 servings of fruits and vegetables every day. It may seem like a lot, but it is not hard to reach this goal. A serving or helping is 1 piece of fruit, 1 cup of vegetables, or 2 cups of leafy, raw vegetables. Have an apple or some carrot sticks as an afternoon snack instead of a candy bar. Try to have fruits and/or vegetables at every meal.  Make exercise part of your daily routine.  You may want to start with simple activities, such as walking, bicycling, or slow swimming. Try to be active 30 to 60 minutes every day. You do not need to do all 30 to 60 minutes all at once. For example, you can exercise 3 times a day for 10 or 20 minutes. Moderate exercise is safe for most people, but it is always a good idea to talk to your doctor before starting an exercise program.  Keep moving. Farshad Stevensder the lawn, work in the garden, or [employer]. Take the stairs instead of the elevator at work. If you smoke, quit. People who smoke have an increased risk for heart attack, stroke, cancer, and other lung illnesses. Quitting is hard, but there are ways to boost your chance of quitting tobacco for good. Use nicotine gum, patches, or lozenges. Ask your doctor about stop-smoking programs and medicines. Keep trying. In addition to reducing your risk of diseases in the future, you will notice some benefits soon after you stop using tobacco. If you have shortness of breath or asthma symptoms, they will likely get better within a few weeks after you quit. Limit how much alcohol you drink. Moderate amounts of alcohol (up to 2 drinks a day for men, 1 drink a day for women) are okay. But drinking too much can lead to liver problems, high blood pressure, and other health problems. Family health  If you have a family, there are many things you can do together to improve your health. Eat meals together as a family as often as possible. Eat healthy foods. This includes fruits, vegetables, lean meats and dairy, and whole grains. Include your family in your fitness plan. Most people think of activities such as jogging or tennis as the way to fitness, but there are many ways you and your family can be more active. Anything that makes you breathe hard and gets your heart pumping is exercise. Here are some tips:  Walk to do errands or to take your child to school or the bus. Go for a family bike ride after dinner instead of watching TV. Where can you learn more?   Go to [email or web address] and sign in to your [employer] account. Enter P592 in the [facility] box to learn more about \"A Healthy Lifestyle: Care Instructions. \"     If you do not have an account, please click on the \"Sign Up Now\" link. Current as of: July 26, 2016  Content Version: 11.2  © 3843-9005 [employer]. Care instructions adapted under license by [facility] [place] ([facility]). If you have questions about a medical condition or this instruction, always ask your healthcare professional. [address] any warranty or liability for your use of this information.

## 2021-07-08 NOTE — PATIENT INSTRUCTIONS
* FALL   PRECAUTIONS. *  USE   WALKING  ASSISTANCE  DEVICES      /   WALKER      *  TO  SUPERVISED     CARE        *   ADEQUATE   FLUID  INTAKE   AND  ELECTROLYTE  BALANCE           * KEEP  DAIRY  OF   THE  NEUROLOGICAL  SYMPTOMS          *  TO  MAINTAIN  REGULAR  SLEEP  WAKE  CYCLES. *   TO  HAVE  ADEQUATE  REST  AND   SLEEP    HOURS.        *    AVOID  USAGE OF   TOBACCO,  EXCESSIVE  ALCOHOL                AND   ILLEGAL   SUBSTANCES,  IF  ANY          *  Maintain   Healthy  Life Style    With   Periodic  Monitoring  Of      Any  Medical  Conditions  Including   Elevated  Blood  Pressure,  Lipid  Profile,     Blood  Sugar levels  And   Heart  Disease. *   Period   Screening  For  Cancers  Involving  Breast,  Colon,   lungs  And  Other  Organs  As  Applicable,  In consultation   With  Your  Primary Care Providers. *  If   There is  Any  Significant  Worsening   Of  Current  Symptoms  And  Or  If    Any additional  New  Neurological  Symptoms                 Or  Significant  Concerns   Should  Call  911 or  Go  To  Emergency  Department  For  Further  Immediate  Evaluation.

## 2022-01-13 ENCOUNTER — TELEPHONE (OUTPATIENT)
Dept: NEUROLOGY | Age: 82
End: 2022-01-13

## 2022-01-13 NOTE — TELEPHONE ENCOUNTER
This writer contacted patient in re: missed appointment today with Dr. Sherri Marino - r/s to [phone].

## 2022-02-16 ENCOUNTER — OFFICE VISIT (OUTPATIENT)
Dept: NEUROLOGY | Age: 82
End: 2022-02-16
Payer: MEDICARE

## 2022-02-16 VITALS
HEIGHT: 68 IN | DIASTOLIC BLOOD PRESSURE: 68 MMHG | HEART RATE: 78 BPM | SYSTOLIC BLOOD PRESSURE: 118 MMHG | BODY MASS INDEX: 23.22 KG/M2 | OXYGEN SATURATION: 94 % | WEIGHT: 153.2 LBS

## 2022-02-16 DIAGNOSIS — R41.0 CONFUSION: ICD-10-CM

## 2022-02-16 DIAGNOSIS — F41.9 ANXIETY AND DEPRESSION: ICD-10-CM

## 2022-02-16 DIAGNOSIS — Z82.49 FAMILY HISTORY OF HEART DISEASE: ICD-10-CM

## 2022-02-16 DIAGNOSIS — I67.82 CHRONIC CEREBRAL ISCHEMIA: ICD-10-CM

## 2022-02-16 DIAGNOSIS — Z91.81 H/O FALLING: ICD-10-CM

## 2022-02-16 DIAGNOSIS — G63 POLYNEUROPATHY ASSOCIATED WITH UNDERLYING DISEASE (HCC): ICD-10-CM

## 2022-02-16 DIAGNOSIS — Z91.199 NON COMPLIANCE WITH MEDICAL TREATMENT: ICD-10-CM

## 2022-02-16 DIAGNOSIS — G30.1 LATE ONSET ALZHEIMER'S DISEASE WITHOUT BEHAVIORAL DISTURBANCE (HCC): Primary | ICD-10-CM

## 2022-02-16 DIAGNOSIS — Z87.898 H/O URINARY INCONTINENCE: ICD-10-CM

## 2022-02-16 DIAGNOSIS — F32.A ANXIETY AND DEPRESSION: ICD-10-CM

## 2022-02-16 DIAGNOSIS — I34.1 MITRAL VALVE PROLAPSE: ICD-10-CM

## 2022-02-16 DIAGNOSIS — F02.80 LATE ONSET ALZHEIMER'S DISEASE WITHOUT BEHAVIORAL DISTURBANCE (HCC): Primary | ICD-10-CM

## 2022-02-16 DIAGNOSIS — E03.9 ACQUIRED HYPOTHYROIDISM: ICD-10-CM

## 2022-02-16 DIAGNOSIS — I63.19 CEREBRAL INFARCTION DUE TO EMBOLISM OF OTHER PRECEREBRAL ARTERY (HCC): ICD-10-CM

## 2022-02-16 PROCEDURE — 99214 OFFICE O/P EST MOD 30 MIN: CPT | Performed by: PSYCHIATRY & NEUROLOGY

## 2022-02-16 RX ORDER — MEMANTINE HYDROCHLORIDE 28 MG/1
28 CAPSULE, EXTENDED RELEASE ORAL DAILY
Qty: 90 CAPSULE | Refills: 1 | Status: SHIPPED | OUTPATIENT
Start: 2022-02-16 | End: 2022-09-09 | Stop reason: SDUPTHER

## 2022-02-16 ASSESSMENT — ENCOUNTER SYMPTOMS
COLOR CHANGE: 0
WHEEZING: 0
SHORTNESS OF BREATH: 0
VOMITING: 0
DIARRHEA: 0
NAUSEA: 0
VOICE CHANGE: 0
VISUAL CHANGE: 0
TROUBLE SWALLOWING: 0
CHEST TIGHTNESS: 0
SORE THROAT: 0
BOWEL INCONTINENCE: 0
ABDOMINAL DISTENTION: 0
EYE ITCHING: 0
APNEA: 0
EYE REDNESS: 0
ABDOMINAL PAIN: 0
SINUS PRESSURE: 0
EYE PAIN: 0
BLOOD IN STOOL: 0
CHOKING: 0
EYE DISCHARGE: 0
FACIAL SWELLING: 0
BACK PAIN: 0
COUGH: 0
PHOTOPHOBIA: 0
CONSTIPATION: 0

## 2022-02-16 NOTE — PROGRESS NOTES
[facility]  Neurology  [address] [address]  Phone: [phone]   Fax: [phone]      SUBJECTIVE:     PATIENT ID:  Adia Post is a  RIGHT HANDED 78 y.o. female. Neurologic Problem  The patient's primary symptoms include clumsiness, focal sensory loss, a loss of balance, memory loss and weakness. The patient's pertinent negatives include no altered mental status, focal weakness, near-syncope, slurred speech, syncope or visual change. This is a chronic problem. Episode onset: MORE  THAN   5  YEARS. The neurological problem developed insidiously. The problem has been gradually worsening since onset. There was left-sided, lower extremity and right-sided focality noted. Pertinent negatives include no abdominal pain, auditory change, aura, back pain, bladder incontinence, bowel incontinence, chest pain, confusion, diaphoresis, dizziness, fatigue, fever, headaches, light-headedness, nausea, neck pain, palpitations, shortness of breath, vertigo or vomiting. Past treatments include medication. The treatment provided mild relief. Her past medical history is significant for dementia, head trauma and mood changes. There is no history of a bleeding disorder, a clotting disorder, a CVA, liver disease or seizures. History obtained from  The patient     AND  HER       and other  available medical records were  Also  reviewed. The  Duration,  Quality,  Severity,  Location,  Timing,  Context,  Modifying  Factors   Of   The   Chief   Complaint       And  Present  Illness   Was   Reviewed   In   Chronological   Manner.                                           PATIENT'S  MAIN  CONCERNS INCLUDE :                         1)  CHRONIC     MEMORY  PROBLEMS                                   MORE  THAN     10   YEARS                                PATIENT     HAS     SEVERE   DEMENTIA                            2)        FAMILY    H/O    DEMENTIA 3)    PREVIOUS   H/O    BEING   ENROLLED  IN   DEMENTIA   RESEARCH  STUDY                                  AT   [facility]       FOR   5  YEARS  IN  THE PAST                         4)       HAS  BEEN      ON   NAMENDA      FOR   MORE   THAN     5  YEARS                       5)    PREVIOUS     H/O   BAD  DREAMS   WITH    ARICEPT                               AT    REGULAR  DOSE. 6)   H/O   PROGRESSIVE     COGNITIVE  AND  MEMORY  PROBLEMS    GETTING                                        WORSE    SINCE     2017  SLOWLY . 7)     H/O    CHRONIC     BALANCE  AND  GAIT    PROBLEMS                                        -    STABLE                            8)       PREVIOUS     H/O      FALL                            AND  MILD   HEAD  INJURY      IN   MAY   2018                              NO  H/O   RECENT  FALLS                             9)    H/O   PULMONARY   EMBOLISM     MORE  THAN    5   YEARS   AGO                             --   ON    XARELTO                           10)    H/O   CHRONIC    ANXIETY,   DEPRESSION                                  -     STABLE                            ON    LEXAPRO                                 TO  FOLLOW  WITH  MENTAL  HEALTH  PROFESSIONALS                         11)    H/O   CHRONIC   DIABETIC  PERIPHERAL  NEUROPATHY                                      -    STABLE                          12)    MULTIPLE  CO  MORBID   MEDICAL   CONDITIONS                               BEING  FOLLOWED     BY     HER  PCP.                                                      13)       HAD NEURO  DIAGNOSTIC  EVALUATIONS                                    IN   SEPT. 2018   WITH    CAROTID,   EEG      WHICH                                     DID NOT  SHOW  SIGNIFICANT  ABNORMALITIES                                    TCD   ABNORMAL    AND  PATIENT  ALREADY  ON XARELTO                                         FOR  CARDIOPULMONARY      REASONS. 14)      PREVIOUS    H/O    CONFUSION                                     NO  RECURRENCE  OF  THE  SAME. NO    H/O     HALLUCINATIONS    OR  DELUSIONS                                                    15)           CURRENTLY  PATIENT  HAS  SEVERE   DEMENTIA                                         ON    ARICEPT  AND  NAMENDA                                         AND  TOLERATING  THE  SAME                                     AS  PER  HER                             16)       PATIENT    WAS   RETIRED  NURSE     AND   WORKED  IN                                          [address] [place] [address]    IN   THE  PAST. 17)          PATIENT'S   SPOUSE IS     PRIMARY  CARE   GIVER.                                     VISITING  NURSES  PERIODICALLY  ALSO   HELP   THE  PATIENT. PATIENT    CAN   DO  BATHING   AND   DRESSING   BY   HER  SELF.                             18)      PATIENT'S     INDICATED                                  A)      PATIENT   HAS  BEEN                                            REFUSING    TO  TAKE  HER   MEDICATIONS     SOMETIMES                                                             B)       PATIENT   ALSO   INCONTINENT                                   C)    NO     H/O   AGITATION                                       NO  BEHAVIORAL  PROBLEMS                                  D)    PATIENT   EATING   WELL   AND  SLEEPING  WELL                        19)     PATIENT         ALSO   REQUESTED    TO  SWITCH                             NAMENDA    XR      FOR   COMPLIANCE     REASONS                                20)         RE  RECOMMENDED  :                                      A)    TOTAL   SUPERVISED     CARE                                     B)    HELP  WITH   ACTIVITIES OF  DAILY  LIVING                                            INCLUDING  PERSONAL   HYGIENE                                C)     FALL  PRECAUTIONS                                  D)     NURSING   HOME  PLACEMENT     TO      REDUCE                                               MORBIDITY     IN  VIEW  OF   HER  SEVERE  DEMENTIA                                         -    ABOVE  REVIEWED   AND  DISCUSSED   WITH  PATIENT                                              AND  HER     IN  DETAIL. 21)     VARIOUS  RISK   FACTORS   WERE  REVIEWED   AND   DISCUSSED. PATIENT   HAS  MULTIPLE   MEDICAL, MENTAL HEALTH                                                NEUROLOGICAL   PROBLEMS . PATIENT'S   MANAGEMENT  IS  CHALLENGING.                    PRECIPITATING  FACTORS: including  fever/infection, exertion/relaxation, position change, stress, weather     change, medications/alcohol, time of day/darkness/light  Are    Absent                                                               MODIFYING  FACTORS:  fever/infection, exertion/relaxation, position change, stress, weather change,     medications/alcohol, time of day/darkness/light  Are  absent             Patient   Indicates   The  Presence   And  The  Absence  Of  The  Following  Associated      And   Additional  Neurological    Symptoms:                                Balance  And coordination problems  present           Gait problems     present            Headaches      absent              Migraines           absent           Memory problems        Present             Confusion        present            Paresthesia numbness          present           Seizures  And  Starring  Episodes           absent           Syncope,  Near  syncopal episodes         absent           Speech problems           absent Swallowing  Problems      absent            Dizziness,  Light headedness           absent                        Vertigo        absent             Generalized   Weakness    absent              focal  Weakness     present             Tremors         absent              Sleep  Problems     absent             History  Of   Recent   Head  Injury     absent             History  Of   Recent  TIA     absent             History  Of   Recent    Stroke     absent             Neck  Pain and  Neck muscle  Spasms  Absent               Radiating  down   And   Weakness           absent            Lower back   Pain  And     Spasms  Absent              Radiating    Down   And   Weakness          absent                H/O   FALLS        present               History  Of   Visual  Symptoms    Absent                  Associated   Diplopia       absent                                  Also   Additional   Symptoms   Present    As  Documented    In   The detailed      Review  Of  Systems   And    Please   Refer   To    Them for   Additional  Information. Any components  That are either  Unobtainable  Or  Limited  In   HPI, ROS  And/or PFSH       Are   Due   To   Patient's  Medical  Problems,  Clinical  Condition and/or lack of other  Alternate resources.               RECORDS   REVIEWED:    historical medical records         INFORMATION   REVIEWED:     MEDICAL   HISTORY,     SURGICAL   HISTORY,   MEDICATIONS   LIST,   ALLERGIES AND  DRUG  INTOLERANCES,     FAMILY   HISTORY,  SOCIAL  HISTORY,    PROBLEM  LIST   FOR  PATIENT  CARE   COORDINATION        Past Medical History:   Diagnosis Date    Arthritis     Diabetes mellitus ([facility]     Essential hypertension     Family history of clotting disorder     Family history of heart disease     History of pulmonary embolism     History of total hip replacement     Hypothyroidism     Mild cognitive impairment     Mitral valve prolapse     Mixed hyperlipidemia     Rheumatoid arthritis ([facility] [address]          Past Surgical History:   Procedure Laterality Date    CERVICAL SPINE SURGERY      HYSTERECTOMY      JOINT REPLACEMENT      MASTOIDECTOMY Right     THYROIDECTOMY      TYMPANOPLASTY Left          Current Outpatient Medications   Medication Sig Dispense Refill    memantine ER (NAMENDA XR) 28 MG CP24 extended release capsule Take 1 capsule by mouth daily ONE  CAPSULE  BY MOUTH   DAILY 90 capsule 1    memantine (NAMENDA) 10 MG tablet Take 1 tablet by mouth 2 times daily 60 tablet 5    donepezil (ARICEPT) 5 MG tablet TAKE 1 TABLET DAILY      levothyroxine (SYNTHROID) 75 MCG tablet Take 75 mcg by mouth      rivaroxaban (XARELTO) 20 MG TABS tablet Take 20 mg by mouth daily (with breakfast)      ATENOLOL PO Take 25 mg by mouth       omeprazole (PRILOSEC) 20 MG delayed release capsule Take 20 mg by mouth 2 times daily      simvastatin (ZOCOR) 40 MG tablet Take 40 mg by mouth every morning      escitalopram (LEXAPRO) 10 MG tablet Take 10 mg by mouth daily      vitamin B-12 (CYANOCOBALAMIN) 1000 MCG tablet Take 1,000 mcg by mouth daily      Cholecalciferol (VITAMIN D-3) 1000 units CAPS Take by mouth daily      metFORMIN (GLUCOPHAGE) 500 MG tablet Take 500 mg by mouth 2 times daily (with meals)      polycarbophil (FIBERCON) 625 MG tablet Take 625 mg by mouth daily (Patient not taking: Reported on 2/16/2022)      Multiple Vitamins-Minerals (MULTIVITAMIN ADULT PO) Take by mouth (Patient not taking: Reported on 7/8/2021)      DiphenhydrAMINE HCl (BENADRYL ALLERGY PO) Take by mouth as needed (Patient not taking: Reported on 2/16/2022)      sulfamethoxazole-trimethoprim (BACTRIM DS;SEPTRA DS) 800-160 MG per tablet Take 1 tablet by mouth as needed (Patient not taking: Reported on 7/8/2021)       No current facility-administered medications for this visit.          Allergies   Allergen Reactions    Codeine Nausea Only    Coumadin [Warfarin] Other (See Comments)     Could not stabalize    Fosamax [Alendronate] Nausea Only    Mercuric Bichloride Hives    Morphine Sulfate [Morphine] Nausea Only         Family History   Problem Relation Age of Onset    Diabetes Mother     Heart Disease Mother     Heart Disease Father     Cancer Sister     Cancer Brother          Social History     Socioeconomic History    Marital status:      Spouse name: Not on file    Number of children: Not on file    Years of education: Not on file    Highest education level: Not on file   Occupational History    Not on file   Tobacco Use    Smoking status: Former Smoker     Quit date: 1994     Years since quittin.1    Smokeless tobacco: Never Used   Vaping Use    Vaping Use: Never used   Substance and Sexual Activity    Alcohol use: Yes     Alcohol/week: 1.0 standard drink     Types: 1 Glasses of wine per week     Comment: 1 glass occasionally    Drug use: No    Sexual activity: Yes     Partners: Male   Other Topics Concern    Not on file   Social History Narrative    Not on file     Social Determinants of Health     Financial Resource Strain:     Difficulty of Paying Living Expenses: Not on file   Food Insecurity:     Worried About Running Out of Food in the Last Year: Not on file    Abdoul of Food in the Last Year: Not on file   Transportation Needs:     Lack of Transportation (Medical): Not on file    Lack of Transportation (Non-Medical):  Not on file   Physical Activity:     Days of Exercise per Week: Not on file    Minutes of Exercise per Session: Not on file   Stress:     Feeling of Stress : Not on file   Social Connections:     Frequency of Communication with Friends and Family: Not on file    Frequency of Social Gatherings with Friends and Family: Not on file    Attends Jain Services: Not on file    Active Member of Clubs or Organizations: Not on file    Attends Club or Organization Meetings: Not on file    Marital Status: Not on file   Intimate Partner Violence:     Fear of Current or Ex-Partner: Not on file    Emotionally Abused: Not on file    Physically Abused: Not on file    Sexually Abused: Not on file   Housing Stability:     Unable to Pay for Housing in the Last Year: Not on file    Number of Places Lived in the Last Year: Not on file    Unstable Housing in the Last Year: Not on file       Vitals:    02/16/22 1501   BP: 118/68   Pulse: 78   SpO2: 94%         Wt Readings from Last 3 Encounters:   02/16/22 153 lb 3.2 oz (69.5 kg)   07/08/21 153 lb (69.4 kg)   01/07/21 158 lb (71.7 kg)         BP Readings from Last 3 Encounters:   02/16/22 118/68   07/08/21 120/70   01/07/21 126/80         Review of Systems   Constitutional: Negative for appetite change, chills, diaphoresis, fatigue, fever and unexpected weight change. HENT: Negative for congestion, dental problem, drooling, ear discharge, ear pain, facial swelling, hearing loss, mouth sores, nosebleeds, postnasal drip, sinus pressure, sore throat, tinnitus, trouble swallowing and voice change. Eyes: Negative for photophobia, pain, discharge, redness, itching and visual disturbance. Respiratory: Negative for apnea, cough, choking, chest tightness, shortness of breath and wheezing. Cardiovascular: Negative for chest pain, palpitations, leg swelling and near-syncope. Gastrointestinal: Negative for abdominal distention, abdominal pain, blood in stool, bowel incontinence, constipation, diarrhea, nausea and vomiting. Endocrine: Negative for cold intolerance, heat intolerance, polydipsia, polyphagia and polyuria. Genitourinary: Negative for bladder incontinence. Musculoskeletal: Positive for gait problem. Negative for arthralgias, back pain, joint swelling, myalgias, neck pain and neck stiffness. Skin: Negative for color change, pallor, rash and wound. Allergic/Immunologic: Negative for environmental allergies, food allergies and immunocompromised state.    Neurological: Positive for weakness, numbness and loss of balance. Negative for dizziness, vertigo, tremors, focal weakness, seizures, syncope, facial asymmetry, speech difficulty, light-headedness and headaches. Hematological: Negative for adenopathy. Does not bruise/bleed easily. Psychiatric/Behavioral: Positive for decreased concentration and memory loss. Negative for agitation, behavioral problems, confusion, dysphoric mood, hallucinations, self-injury, sleep disturbance and suicidal ideas. The patient is nervous/anxious. The patient is not hyperactive. OBJECTIVE:    Physical Exam  Constitutional:       Appearance: She is well-developed. HENT:      Head: Normocephalic and atraumatic. No raccoon eyes or Whitaker's sign. Right Ear: External ear normal.      Left Ear: External ear normal.      Nose: Nose normal.   Eyes:      Conjunctiva/sclera: Conjunctivae normal.   Neck:      Thyroid: No thyroid mass or thyromegaly. Vascular: No carotid bruit. Trachea: No tracheal deviation. Meningeal: Brudzinski's sign and Kernig's sign absent. Cardiovascular:      Rate and Rhythm: Normal rate and regular rhythm. Pulmonary:      Effort: Pulmonary effort is normal.   Musculoskeletal:         General: No tenderness. Cervical back: Normal range of motion and neck supple. No rigidity. No muscular tenderness. Normal range of motion. Skin:     General: Skin is warm. Coloration: Skin is not pale. Findings: No erythema or rash. Nails: There is no clubbing. Psychiatric:         Attention and Perception: She is inattentive. Mood and Affect: Mood is anxious and depressed. Affect is not labile, blunt, angry or inappropriate. Speech: Speech is delayed. Behavior: Behavior is slowed. Behavior is not agitated, aggressive, withdrawn, hyperactive or combative. Behavior is cooperative. Thought Content:  Thought content is not paranoid or delusional. Thought content does not include homicidal or suicidal ideation. Thought content does not include homicidal or suicidal plan. Cognition and Memory: Cognition is impaired. Memory is impaired. She exhibits impaired recent memory and impaired remote memory. Judgment: Judgment is inappropriate. Judgment is not impulsive. NEUROLOGICAL EXAMINATION :      A) MENTAL STATUS:                   Alert and  oriented  To Person. No Aphasia. No  Dysarthria. Able   To  Follow   SIMPLE    commands . No right  To left confusion. SLOW    Speech  And language function. Insight and  Judgment ,Fund  Of  Knowledge   DECREASED                Recent  And  Remote memory  DECREASED                Attention &Concentration are   DECREASED                                                  B) CRANIAL NERVES :             2 CN : Visual  Acuity  And  Visual fields  within normal limits                        Fundi  Could  Not  Be  Could  Not  Be  Evaluated. 3,4,6 CN : Both  Pupils are   Reactive and  Equal.                            Extraocular   Movements  Are  Intact. No  Nystagmus. No  ANTON. No  Afferent  Pupillary  Defect noted. 5 CN :  Normal  Facial sensations and Corneal  Reflexes           7 CN :  Normal  Facial  Symmetry  And  Strength. No facial  Weakness.            8 CN :  Hearing  Appears   DECREASED          9, 10 CN: Normal spontaneous, reflex palate movements         11 CN:   Normal  Shoulder shrug and  Strength         12 CN :   Normal  Tongue movements and  Tongue  In midline                        No tongue   Fasciculations or atrophy         C) MOTOR  EXAM:                 Strength  In upper  And  Lower extremities   within normal limits,                             EXCEPT    DECREASED   HAND                        Rapid alternating  And  repetitions  Movements  within normal limits Muscle  Tone  In upper  And  Lower  Extremities  Normal                No rigidity. No  Spasticity. Bradykinesia   absent               No  Asterixis. Sustention  Tremor , Resting  Tremor   absent                    No other  Abnormal  Movements noted           D) SENSORY :               light touch, pinprick, position  And  Vibration  DECREASED      E) REFLEXES:                   Deep  Tendon  Reflexes   DECREASED                    No pathological  Reflexes  Bilaterally.                                   F) COORDINATION  AND  GAIT :                                Station and  Gait   SLOW                                        Romberg's test   POSITIVE                          Ataxia negative          ASSESSMENT:      Patient Active Problem List   Diagnosis    Rheumatoid arthritis ([address]    Mixed hyperlipidemia    Mitral valve prolapse    Hypothyroidism    Family history of heart disease    Family history of clotting disorder    Essential hypertension    Diabetes mellitus ([address]    Arthritis    Late onset Alzheimer's disease without behavioral disturbance ([address]    Anxiety and depression    H/O falling    Confusion    Polyneuropathy associated with underlying disease ([address]    Mild closed head injury    Chronic cerebral ischemia    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       HISTORY:   ORDERING SYSTEM PROVIDED HISTORY: Cerebral infarction due to embolism of   other precerebral artery (HCC)       FINDINGS:       RIGHT:       The right common carotid artery demonstrates peak systolic velocities of 56,   49 cm/sec in the proximal and distal segments respectively.       The right internal carotid artery demonstrates the systolic velocities of 46,   71, 67 cm/sec in the proximal, are seen. Additional comments: None. IMPRESSION:     1.  No evidence of acute intracranial pathology. 2.  Nondisplaced nasal fractures  3.  Right supraorbital scalp hematoma  4.  Status post right mastoidectomy      [address]      Ordering physician: [facility]    EXAMINATION: CT CERVICAL SPINE W/O CONTRAST    DATE OF EXAM: 5/5/2018 7:38 PM   CLINICAL INFORMATION: Ground level fall head Head    COMPARISON: None. TECHNIQUE: Multi-detector volumetric/helical data set was obtained through the cervical spine without contrast enhancement and viewed in bone windows. Additional sagittal and coronal reconstructions were also performed. FINDINGS:     Discs: Moderate multilevel spondylosis and degenerative facet arthropathy throughout with disc height loss most pronounced at C5-6 and C6-7. Alignment: The cervical vertebral bodies are in anatomic alignment. Prevertebral soft tissues: normal.     Bones: Odontoid is well seen and unremarkable. There is no evidence of fracture. Surrounding soft tissues: Normal with no evidence of mass, adenopathy, abnormal fluid collection    Lung apices: Clear. Visualized skull base: Right mastoidectomy changes and small right mastoid effusion. IMPRESSION:    1.  Moderate multilevel spondylosis and degenerative facet arthropathy throughout with disc height loss most pronounced at C5-6 and C6-7.  2.  Right mastoidectomy changes and small right mastoid effusion. WSN:[id number]      Electronically Richi Sol MD  Signed Date/Time:  5/5/2018 8:08:00 PM      VISITING DIAGNOSIS:      ICD-10-CM    1. Late onset Alzheimer's disease without behavioral disturbance (HCC)  G30.1     F02.80    2. Non compliance with medical treatment  Z91.19    3. Mitral valve prolapse  I34.1    4. H/O falling  Z91.81    5. Anxiety and depression  F41.9     F32. A    6. 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 Polyneuropathy associated with underlying disease ([facility] [address]  G63    8. Family history of heart disease  Z82.49    9. Chronic cerebral ischemia  I67.82    10. Acquired hypothyroidism  E03.9    11. H/O urinary incontinence  Z87.898    12. Confusion  R41.0               CONCERNS   &   INCREASED   RISK   FOR         * TIA,  CEREBRO  VASCULAR  ISCHEMIA, STROKE      *   COGNITIVE  &   MEMORY PROBLEMS  AND  DEMENTIAS       *   PERIPHERAL  NEUROPATHY,   NEUROPATHIC  PAIN       *  GAIT  DIFFICULTIES  &   BALANCE PROBLMES   AND  FALL                  VARIOUS  RISK   FACTORS   WERE  REVIEWED   AND   DISCUSSED. *  PATIENT   HAS  MULTIPLE   MEDICAL, MENTAL HEALTH            NEUROLOGICAL   PROBLEMS . PATIENT'S   MANAGEMENT  IS  CHALLENGING. PLAN:       Javier Vanesaier  Of  The  Diagnoses,  The  Management & Treatment  Options           AND    Care  plan  Were        Reviewed and   Discussed   With  patient. * Goals  And  Expectations  Of  The  Therapy  Discussed   And  Reviewed. *   Benefits   And   Side  Effect  Profile  Of  Medication/s   Were   Discussed             * Need   For  Further   Follow up For  The  Various  Problems  Were discussed. * Results  Of  The  Previous  Diagnostic tests were reviewed and questions answered. patient  understand the same. Medical  Decision  Making  Was  Made  Based on the   Complexity  Of  Above  Mentioned  Diagnoses,        Data reviewed   & diagnostic  Tests  Reviewed,  Risk  Of  Significant   Co morbidities and complicating   Factors. Medical  Decision  Was   High  Complexity  Due   To  The  Patient's  Multiple  Symptoms,  Advancing   Disease,      Complex  Treatment  Regimen,  Multiple medications and   Risk  Of   Side  Effects,  Difficulty  In  Medication  Management      And  Diagnostic  Challenges   In  View  Of  The  Associated   Co  Morbid  Conditions   And  Problems. * FALL   PRECAUTIONS. THESE  REVIEWED   AND  DISCUSSED    *  USE   WALKING  ASSISTANCE  DEVICES     QUAD  CANE       *  AVOID  DRIVING         *    SUPERVISED    CARE           *   BE  CAREFUL  WITH  ACTIVITIES  . *   ADEQUATE   FLUID  INTAKE   AND  ELECTROLYTE  BALANCE           * KEEP  DAIRY  OF   THE  NEUROLOGICAL  SYMPTOMS        RECORDING THE    DURATION  AND  FREQUENCY. *  AVOID    CONDITIONS  AND  FACTORS   THAT  MAKE                  NEUROLOGICAL  SYMPTOMS  WORSE. *  TO  MAINTAIN  REGULAR  SLEEP  WAKE  CYCLES. *   TO  HAVE  ADEQUATE  REST  AND   SLEEP    HOURS.          *    AVOID  ANY USAGE OF                   TOBACCO,  EXCESSIVE  ALCOHOL  AND   ILLEGAL   SUBSTANCES          *  CONTINUE MEDICATIONS PRESCRIBED BY NEUROLOGIST AS    RECOMMENDED     *   Compliance   With  Medications   And  Instructions          *  May   Use  Pill  Box,    If  Needed      *    Prophylactic  Use   Of     Vitamin   B   Complex,  Folic  Acid,    Vitamin  B12    Multivitamin,       Calcium  With  magnesium  And  Vit D    Supplementations   Over  The  Counter  Discussed           *  FOOT  CARE, DAILY  INSPECTION  OF  FEET   AND         PERIODIC  PODIATRY EVALUATIONS .           *  PATIENT  IS  ALSO   COUNSELED   TO  KEEP    ACTIVITIES:           A)   SIMPLE      B)  ORGANIZED      C)  WRITE   DOWN                          -   REVIEWED    WITH  PATIENT  AND  HER  SPOUSE.                      *  EVALUATIONS  AND  FOLLOW UP:    PERIODICALLY                 * PHYSICAL  THERAPY   / OCCUPATIONAL THERAPY                                * CARDIOLOGY              *  ORTHO                  * RHEUMATOLOGY                        *     PATIENT'S     INDICATED                                  A)      PATIENT   HAS  BEEN                                            REFUSING    TO  TAKE  HER   MEDICATIONS     SOMETIMES                                                             B)       PATIENT ALSO   INCONTINENT                                   C)    NO     H/O   AGITATION                                       NO  BEHAVIORAL  PROBLEMS                                  D)    PATIENT   EATING   WELL   AND  SLEEPING  WELL                       *     PATIENT         ALSO   REQUESTED    TO  SWITCH                             NAMENDA    XR      FOR   COMPLIANCE     REASONS                                *       RE  RECOMMENDED  :                                      A)    TOTAL   SUPERVISED     CARE                                     B)    HELP  WITH   ACTIVITIES OF  DAILY  LIVING                                            INCLUDING  PERSONAL   HYGIENE                                C)     FALL  PRECAUTIONS                                  D)     NURSING   HOME  PLACEMENT     TO      REDUCE                                               MORBIDITY     IN  VIEW  OF   HER  SEVERE  DEMENTIA                                         -    ABOVE  REVIEWED   AND  DISCUSSED   WITH  PATIENT                                              AND  HER     IN  DETAIL. *     VARIOUS  RISK   FACTORS   WERE  REVIEWED   AND   DISCUSSED. Orders Placed This Encounter   Medications    memantine ER (NAMENDA XR) 28 MG CP24 extended release capsule     Sig: Take 1 capsule by mouth daily ONE  CAPSULE  BY MOUTH   DAILY     Dispense:  90 capsule     Refill:  1               *PATIENT   TO  FOLLOW  UP  WITH   PRIMARY  CARE            AND   OTHER  CONSULTANTS  AS  BEFORE.             *TO  FOLLOW  WITH   MENTAL  HEALTH  PROFESSIONALS ,  INCLUDING            PSYCHOLOGICAL  COUNSELING   AND  PSYCHIATRIC  EVALUTIONS,                  *  Maintain   Healthy  Life Style    With   Periodic  Monitoring  Of      Any  Medical  Conditions  Including   Elevated  Blood  Pressure,  Lipid  Profile,     Blood  Sugar levels  And   Heart  Disease. *   Period   Screening  For  Cancers  Involving  Breast,  Colon,    lungs  And  Other  Organs  As  Applicable,  In consultation   With  Your  Primary Care Providers. * Second  Neurological  Opinion  And  Evaluations  In  [facility]  Setting  If  Patient  Is  Interested. * Please   Contact   Neurology  Clinic   Early   If   Are  Any  New  Neurological                             Symptoms   And  Any neurological  Concerns. *  If  The  Patient remains  Neurologically  Stable   Return   To  [facility] Neurology Department   IN  3- 6   MONTHS  TIME   FOR  FURTHER              FOLLOW UP.                     *  If   There is  Any  Significant  Worsening   Of  Current  Symptoms  And  Or  If patient  Develops       Any additional  New  Neurological  Symptoms  Or  Significant  Concerns       Should  Call  911 or  Go  To  Emergency  Department  For  Further  Immediate  Evaluation. *   The  Neurological   Findings,  Possible  Diagnosis,  Differential diagnoses       And  Options  For    Further   Investigations   And  management   Are  Discussed  Comprehensively. Medications   And  Prescription   Risks  And  Side effects  Are   Also  Discussed. The  Above  Were  Reviewed  With  patient and   HER           questions  Answered  In  Detail. More   Than   50% of face  To face Time   Was  Spent  On  Counseling   And   Coordination  Of  Care       Of   Patient's multiple   Neurological  Problems   And   Comorbid  Medical   Conditions.               Electronically signed by Rachele Elena MD.,  [facility]       Board Certified in  Neurology &  In  [employer] and Neu[employer] ([employer])      DISCLAIMER:   Although every effort was made to ensure the accuracy of this  electronic transcription, some errors in transcription may have occurred. GENERAL PATIENT INSTRUCTIONS:     A Healthy Lifestyle: Care Instructions  Your Care Instructions  A healthy lifestyle can help you feel good, stay at a healthy weight, and have plenty of energy for both work and play. A healthy lifestyle is something you can share with your whole family. A healthy lifestyle also can lower your risk for serious health problems, such as high blood pressure, heart disease, and diabetes. You can follow a few steps listed below to improve your health and the health of your family. Follow-up care is a key part of your treatment and safety. Be sure to make and go to all appointments, and call your doctor if you are having problems. Its also a good idea to know your test results and keep a list of the medicines you take. How can you care for yourself at home? Do not eat too much sugar, fat, or fast foods. You can still have dessert and treats now and then. The goal is moderation. Start small to improve your eating habits. Pay attention to portion sizes, drink less juice and soda pop, and eat more fruits and vegetables. Eat a healthy amount of food. A 3-ounce serving of meat, for example, is about the size of a deck of cards. Fill the rest of your plate with vegetables and whole grains. Limit the amount of soda and sports drinks you have every day. Drink more water when you are thirsty. Eat at least 5 servings of fruits and vegetables every day. It may seem like a lot, but it is not hard to reach this goal. A serving or helping is 1 piece of fruit, 1 cup of vegetables, or 2 cups of leafy, raw vegetables. Have an apple or some carrot sticks as an afternoon snack instead of a candy bar. Try to have fruits and/or vegetables at every meal.  Make exercise part of your daily routine. You may want to start with simple activities, such as walking, bicycling, or slow swimming. Try to be active 30 to 60 minutes every day. You do not need to do all 30 to 60 minutes all at once. For example, you can exercise 3 times a day for 10 or 20 minutes. Moderate exercise is safe for most people, but it is always a good idea to talk to your doctor before starting an exercise program.  Keep moving. Cydney Bilis the lawn, work in the garden, or [employer]. Take the stairs instead of the elevator at work. If you smoke, quit. People who smoke have an increased risk for heart attack, stroke, cancer, and other lung illnesses. Quitting is hard, but there are ways to boost your chance of quitting tobacco for good. Use nicotine gum, patches, or lozenges. Ask your doctor about stop-smoking programs and medicines. Keep trying. In addition to reducing your risk of diseases in the future, you will notice some benefits soon after you stop using tobacco. If you have shortness of breath or asthma symptoms, they will likely get better within a few weeks after you quit. Limit how much alcohol you drink. Moderate amounts of alcohol (up to 2 drinks a day for men, 1 drink a day for women) are okay. But drinking too much can lead to liver problems, high blood pressure, and other health problems. Family health  If you have a family, there are many things you can do together to improve your health. Eat meals together as a family as often as possible. Eat healthy foods. This includes fruits, vegetables, lean meats and dairy, and whole grains. Include your family in your fitness plan. Most people think of activities such as jogging or tennis as the way to fitness, but there are many ways you and your family can be more active. Anything that makes you breathe hard and gets your heart pumping is exercise. Here are some tips:  Walk to do errands or to take your child to school or the bus. Go for a family bike ride after dinner instead of watching TV. Where can you learn more? Go to https://[facility]. org and sign in to your [employer] account.  Enter A014 in the [employer] box to learn more about \"A Healthy Lifestyle: Care Instructions. \"     If you do not have an account, please click on the \"Sign Up Now\" link. Current as of: July 26, 2016  Content Version: 11.2  © 0429-8355 [employer]. Care instructions adapted under license by [facility] ([facility]). If you have questions about a medical condition or this instruction, always ask your healthcare professional. [address] any warranty or liability for your use of this information.

## 2022-02-16 NOTE — PATIENT INSTRUCTIONS
* FALL   PRECAUTIONS.            *   TOTAL   SUPERVISED    CARE  . *   ADEQUATE   FLUID  INTAKE   AND  ELECTROLYTE  BALANCE             * KEEP  DAIRY  OF   THE  NEUROLOGICAL  SYMPTOMS          *  TO  MAINTAIN  REGULAR  SLEEP  WAKE  CYCLES. *   TO  HAVE  ADEQUATE  REST  AND   SLEEP    HOURS.          *    AVOID  USAGE OF   TOBACCO,  EXCESSIVE  ALCOHOL                AND   ILLEGAL   SUBSTANCES,  IF  ANY          *  Maintain   Healthy  Life Style    With   Periodic  Monitoring  Of         Any  Medical  Conditions  Including   Elevated  Blood  Pressure,  Lipid  Profile,       Blood  Sugar levels  And   Heart  Disease. *   Period   Screening  For  Cancers  Involving  Breast,  Colon,         Lungs  And  Other  Organs  As  Applicable,           In consultation   With  Your  Primary Care Providers. *  If   There is  Any  Significant  Worsening   Of  Current  Symptoms  And             Or  If    Any additional  New  Neurological  Symptoms  and          Significant  Concerns   Should  Call  911 or  Go  To  Emergency  Department            For  Further  Immediate  Evaluation.

## 2022-09-09 ENCOUNTER — OFFICE VISIT (OUTPATIENT)
Dept: NEUROLOGY | Age: 82
End: 2022-09-09
Payer: MEDICARE

## 2022-09-09 VITALS
WEIGHT: 148 LBS | BODY MASS INDEX: 22.43 KG/M2 | OXYGEN SATURATION: 95 % | SYSTOLIC BLOOD PRESSURE: 108 MMHG | HEART RATE: 78 BPM | HEIGHT: 68 IN | DIASTOLIC BLOOD PRESSURE: 64 MMHG

## 2022-09-09 DIAGNOSIS — Z87.898 H/O URINARY INCONTINENCE: ICD-10-CM

## 2022-09-09 DIAGNOSIS — Z83.2 FAMILY HISTORY OF CLOTTING DISORDER: ICD-10-CM

## 2022-09-09 DIAGNOSIS — S09.90XS MILD CLOSED HEAD INJURY, SEQUELA: ICD-10-CM

## 2022-09-09 DIAGNOSIS — Z91.81 H/O FALLING: ICD-10-CM

## 2022-09-09 DIAGNOSIS — G30.1 LATE ONSET ALZHEIMER'S DISEASE WITHOUT BEHAVIORAL DISTURBANCE (HCC): Primary | ICD-10-CM

## 2022-09-09 DIAGNOSIS — G63 POLYNEUROPATHY ASSOCIATED WITH UNDERLYING DISEASE (HCC): ICD-10-CM

## 2022-09-09 DIAGNOSIS — M19.90 ARTHRITIS: ICD-10-CM

## 2022-09-09 DIAGNOSIS — I63.19 CEREBRAL INFARCTION DUE TO EMBOLISM OF OTHER PRECEREBRAL ARTERY (HCC): ICD-10-CM

## 2022-09-09 DIAGNOSIS — I34.1 MITRAL VALVE PROLAPSE: ICD-10-CM

## 2022-09-09 DIAGNOSIS — Z91.199 NON COMPLIANCE WITH MEDICAL TREATMENT: ICD-10-CM

## 2022-09-09 DIAGNOSIS — R41.0 CONFUSION: ICD-10-CM

## 2022-09-09 DIAGNOSIS — F02.80 LATE ONSET ALZHEIMER'S DISEASE WITHOUT BEHAVIORAL DISTURBANCE (HCC): Primary | ICD-10-CM

## 2022-09-09 PROCEDURE — 99214 OFFICE O/P EST MOD 30 MIN: CPT | Performed by: PSYCHIATRY & NEUROLOGY

## 2022-09-09 PROCEDURE — 1123F ACP DISCUSS/DSCN MKR DOCD: CPT | Performed by: PSYCHIATRY & NEUROLOGY

## 2022-09-09 RX ORDER — DONEPEZIL HYDROCHLORIDE 5 MG/1
TABLET, FILM COATED ORAL
Qty: 30 TABLET | Refills: 5 | Status: SHIPPED | OUTPATIENT
Start: 2022-09-09

## 2022-09-09 RX ORDER — MEMANTINE HYDROCHLORIDE 28 MG/1
28 CAPSULE, EXTENDED RELEASE ORAL DAILY
Qty: 90 CAPSULE | Refills: 1 | Status: SHIPPED | OUTPATIENT
Start: 2022-09-09

## 2022-09-09 ASSESSMENT — ENCOUNTER SYMPTOMS
EYE DISCHARGE: 0
TROUBLE SWALLOWING: 0
SHORTNESS OF BREATH: 0
VISUAL CHANGE: 0
APNEA: 0
EYE REDNESS: 0
COLOR CHANGE: 0
SINUS PRESSURE: 0
WHEEZING: 0
SORE THROAT: 0
BLOOD IN STOOL: 0
BOWEL INCONTINENCE: 0
BACK PAIN: 0
COUGH: 0
FACIAL SWELLING: 0
EYE ITCHING: 0
NAUSEA: 0
CONSTIPATION: 0
CHEST TIGHTNESS: 0
PHOTOPHOBIA: 0
DIARRHEA: 0
VOMITING: 0
ABDOMINAL PAIN: 0
VOICE CHANGE: 0
EYE PAIN: 0
CHOKING: 0
ABDOMINAL DISTENTION: 0

## 2022-09-09 NOTE — PATIENT INSTRUCTIONS
TOTAL  SUPERVISED    CARE        *   ADEQUATE   FLUID  INTAKE   AND  ELECTROLYTE  BALANCE             * KEEP  DAIRY  OF   THE  NEUROLOGICAL  SYMPTOMS          *  TO  MAINTAIN  REGULAR  SLEEP  WAKE  CYCLES. *   TO  HAVE  ADEQUATE  REST  AND   SLEEP    HOURS.          *    AVOID  USAGE OF   TOBACCO,  EXCESSIVE  ALCOHOL                AND   ILLEGAL   SUBSTANCES,  IF  ANY          *  Maintain   Healthy  Life Style    With   Periodic  Monitoring  Of         Any  Medical  Conditions  Including   Elevated  Blood  Pressure,  Lipid  Profile,       Blood  Sugar levels  And   Heart  Disease. *   Period   Screening  For  Cancers  Involving  Breast,  Colon,         Lungs  And  Other  Organs  As  Applicable,           In consultation   With  Your  Primary Care Providers. *  If   There is  Any  Significant  Worsening   Of  Current  Symptoms  And             Or  If    Any additional  New  Neurological  Symptoms  and          Significant  Concerns   Should  Call  911 or  Go  To  Emergency  Department            For  Further  Immediate  Evaluation.

## 2022-09-09 NOTE — PROGRESS NOTES
[facility]  Neurology  [address] [address]  Phone: [phone]   Fax: [phone]      SUBJECTIVE:     PATIENT ID:  Clay Roche is a  RIGHT HANDED 78 y.o. female. Neurologic Problem  The patient's primary symptoms include clumsiness, focal sensory loss, a loss of balance, memory loss and weakness. The patient's pertinent negatives include no altered mental status, focal weakness, near-syncope, slurred speech, syncope or visual change. This is a chronic problem. Episode onset: MORE  THAN   5  YEARS. The neurological problem developed insidiously. The problem has been gradually worsening since onset. There was left-sided, lower extremity and right-sided focality noted. Pertinent negatives include no abdominal pain, auditory change, aura, back pain, bladder incontinence, bowel incontinence, chest pain, confusion, diaphoresis, dizziness, fatigue, fever, headaches, light-headedness, nausea, neck pain, palpitations, shortness of breath, vertigo or vomiting. Past treatments include medication. The treatment provided mild relief. Her past medical history is significant for dementia, head trauma and mood changes. There is no history of a bleeding disorder, a clotting disorder, a CVA, liver disease or seizures. History obtained from  The patient     AND  HER       and other  available medical records were  Also  reviewed. The  Duration,  Quality,  Severity,  Location,  Timing,  Context,  Modifying  Factors   Of   The   Chief   Complaint       And  Present  Illness   Was   Reviewed   In   Chronological   Manner.                                           PATIENT'S  MAIN  CONCERNS INCLUDE :                         1)  CHRONIC     MEMORY  PROBLEMS                                   MORE  THAN     10   YEARS                                PATIENT     HAS     SEVERE   DEMENTIA                            2)        FAMILY    H/O    DEMENTIA 3)    PREVIOUS   H/O    BEING   ENROLLED  IN   DEMENTIA   RESEARCH  STUDY                                  AT   [facility]       FOR   5  YEARS  IN  THE PAST                         4)       HAS  BEEN      ON   NAMENDA      FOR   MORE   THAN     5  YEARS                       5)    PREVIOUS     H/O   BAD  DREAMS   WITH    ARICEPT                               AT    REGULAR  DOSE. 6)   H/O   PROGRESSIVE     COGNITIVE  AND  MEMORY  PROBLEMS    GETTING                                        WORSE    SINCE     2017  SLOWLY . 7)     H/O    CHRONIC     BALANCE  AND  GAIT    PROBLEMS                                        -    STABLE                            8)       PREVIOUS     H/O      FALL                            AND  MILD   HEAD  INJURY      IN   MAY   2018                              NO  H/O   RECENT  FALLS                             9)    H/O   PULMONARY   EMBOLISM     MORE  THAN    5   YEARS   AGO                             --   ON    XARELTO                           10)    H/O   CHRONIC    ANXIETY,   DEPRESSION                                  -     STABLE                            ON    LEXAPRO                                 TO  FOLLOW  WITH  MENTAL  HEALTH  PROFESSIONALS                         11)    H/O   CHRONIC   DIABETIC  PERIPHERAL  NEUROPATHY                                      -    STABLE                          12)    MULTIPLE  CO  MORBID   MEDICAL   CONDITIONS                               BEING  FOLLOWED     BY     HER  PCP.                                                      13)       HAD NEURO  DIAGNOSTIC  EVALUATIONS                                    IN   SEPT. 2018   WITH    CAROTID,   EEG      WHICH                                     DID NOT  SHOW  SIGNIFICANT  ABNORMALITIES                                    TCD   ABNORMAL    AND  PATIENT  ALREADY  ON XARELTO                                         FOR  CARDIOPULMONARY      REASONS. 14)      PREVIOUS    H/O    CONFUSION                                     NO  RECURRENCE  OF  THE  SAME. NO    H/O     HALLUCINATIONS    OR  DELUSIONS                                                    15)           CURRENTLY  PATIENT  HAS  SEVERE   DEMENTIA                                         ON    ARICEPT  AND  NAMENDA                                         AND  TOLERATING  THE  SAME                                     AS  PER  HER                             16)       PATIENT    WAS   RETIRED  NURSE     AND   WORKED  IN                                          [employer]     AND    [employer]    IN   THE  PAST. 17)          PATIENT'S   SPOUSE IS     PRIMARY  CARE   GIVER.                                     VISITING  NURSES  PERIODICALLY  ALSO   HELP   THE  PATIENT. PATIENT    CAN   DO  BATHING   AND   DRESSING   BY   HER  SELF. 18)      PATIENT'S     INDICATED                                  A)      PATIENT    REFUSES     TO  TAKE  HER   MEDICATIONS                                         SOMETIMES    BUT   COOPERATIVE                                                           B)       PATIENT   ALSO   INCONTINENT                                   C)    NO     H/O   AGITATION                                       NO  BEHAVIORAL  PROBLEMS                                  D)    PATIENT   EATING   WELL   AND  SLEEPING  WELL                                   E)     PATIENT   ON    NAMENDA    XR      FOR   COMPLIANCE     REASONS                               19)     DURING     VISIT   ON  09/09/2022 ,                                PATIENT    FOUND  TO  BE                                    A)    PLEASANTLY  CONFUSED,     HAPPY .                                     B)    DOES NOT   KNOW   HOW  MANY  CHILDREN  SHE    HAS. C)       DOES   NOT  REMEMBER    HER   'S   NAME. D)    DOES  NOT   RECALL    WHAT  SHE   ATE  FOR  HER  LUNCH                                                                  PATIENT    DENIED     ANY   CONCERNS                           20)         RE  RECOMMENDED  :                                      A)    TOTAL   SUPERVISED     CARE                                     B)    HELP  WITH   ACTIVITIES OF  DAILY  LIVING                                            INCLUDING  PERSONAL   HYGIENE                                C)     FALL  PRECAUTIONS                                  D)     NURSING   HOME  PLACEMENT     TO      REDUCE                                               MORBIDITY     IN  VIEW  OF   HER  SEVERE  DEMENTIA                                E)     TO  CONTINUE  NAMENDA  XR   AND  ARICEPT   5   MG  PO  DAILY                                        -    ABOVE  REVIEWED   AND  DISCUSSED   WITH  PATIENT                                              AND  HER     IN  DETAIL. 21)     VARIOUS  RISK   FACTORS   WERE  REVIEWED   AND   DISCUSSED. PATIENT   HAS  MULTIPLE   MEDICAL, MENTAL HEALTH                                                NEUROLOGICAL   PROBLEMS . PATIENT'S   MANAGEMENT  IS  CHALLENGING.                    PRECIPITATING  FACTORS: including  fever/infection, exertion/relaxation, position change, stress, weather     change, medications/alcohol, time of day/darkness/light  Are    Absent                                                               MODIFYING  FACTORS:  fever/infection, exertion/relaxation, position change, stress, weather change,     medications/alcohol, time of day/darkness/light  Are absent             Patient   Indicates   The  Presence   And  The  Absence  Of  The  Following  Associated      And   Additional  Neurological    Symptoms:                                Balance  And coordination problems  present           Gait problems     present            Headaches      absent              Migraines           absent           Memory problems        Present             Confusion        present            Paresthesia numbness          present           Seizures  And  Starring  Episodes           absent           Syncope,  Near  syncopal episodes         absent           Speech problems           absent             Swallowing  Problems      absent            Dizziness,  Light headedness           absent                        Vertigo        absent             Generalized   Weakness    absent              focal  Weakness     present             Tremors         absent              Sleep  Problems     absent             History  Of   Recent   Head  Injury     absent             History  Of   Recent  TIA     absent             History  Of   Recent    Stroke     absent             Neck  Pain and  Neck muscle  Spasms  Absent               Radiating  down   And   Weakness           absent            Lower back   Pain  And     Spasms  Absent              Radiating    Down   And   Weakness          absent                H/O   FALLS        present               History  Of   Visual  Symptoms    Absent                  Associated   Diplopia       absent                                  Also   Additional   Symptoms   Present    As  Documented    In   The detailed      Review  Of  Systems   And    Please   Refer   To    Them for   Additional  Information. Any components  That are either  Unobtainable  Or  Limited  In   HPI, ROS  And/or PFSH       Are   Due   To   Patient's  Medical  Problems,  Clinical  Condition and/or lack of other  Alternate resources.               RECORDS REVIEWED:    historical medical records         INFORMATION   REVIEWED:     MEDICAL   HISTORY,     SURGICAL   HISTORY,   MEDICATIONS   LIST,   ALLERGIES AND  DRUG  INTOLERANCES,     FAMILY   HISTORY,  SOCIAL  HISTORY,    PROBLEM  LIST   FOR  PATIENT  CARE   COORDINATION        Past Medical History:   Diagnosis Date    Arthritis     Diabetes mellitus ([facility] [address]     Essential hypertension     Family history of clotting disorder     Family history of heart disease     History of pulmonary embolism     History of total hip replacement     Hypothyroidism     Mild cognitive impairment     Mitral valve prolapse     Mixed hyperlipidemia     Rheumatoid arthritis ([facility] [address]          Past Surgical History:   Procedure Laterality Date    CERVICAL SPINE SURGERY      HYSTERECTOMY (CERVIX STATUS UNKNOWN)      JOINT REPLACEMENT      MASTOIDECTOMY Right     THYROIDECTOMY      TYMPANOPLASTY Left          Current Outpatient Medications   Medication Sig Dispense Refill    memantine ER (NAMENDA XR) 28 MG CP24 extended release capsule Take 1 capsule by mouth daily ONE  CAPSULE  BY MOUTH   DAILY 90 capsule 1    memantine (NAMENDA) 10 MG tablet Take 1 tablet by mouth 2 times daily 60 tablet 5    donepezil (ARICEPT) 5 MG tablet TAKE 1 TABLET DAILY      levothyroxine (SYNTHROID) 75 MCG tablet Take 75 mcg by mouth      rivaroxaban (XARELTO) 20 MG TABS tablet Take 20 mg by mouth daily (with breakfast)      ATENOLOL PO Take 25 mg by mouth       omeprazole (PRILOSEC) 20 MG delayed release capsule Take 20 mg by mouth 2 times daily      simvastatin (ZOCOR) 40 MG tablet Take 40 mg by mouth every morning      escitalopram (LEXAPRO) 10 MG tablet Take 10 mg by mouth daily      vitamin B-12 (CYANOCOBALAMIN) 1000 MCG tablet Take 1,000 mcg by mouth daily      Cholecalciferol (VITAMIN D-3) 1000 units CAPS Take by mouth daily      polycarbophil (FIBERCON) 625 MG tablet Take 625 mg by mouth daily (Patient not taking: Reported on 2/16/2022)      metFORMIN (GLUCOPHAGE) 500 MG tablet Take 500 mg by mouth 2 times daily (with meals)      Multiple Vitamins-Minerals (MULTIVITAMIN ADULT PO) Take by mouth (Patient not taking: Reported on 2021)      DiphenhydrAMINE HCl (BENADRYL ALLERGY PO) Take by mouth as needed (Patient not taking: Reported on 2022)      sulfamethoxazole-trimethoprim (BACTRIM DS;SEPTRA DS) 800-160 MG per tablet Take 1 tablet by mouth as needed (Patient not taking: Reported on 2021)       No current facility-administered medications for this visit. Allergies   Allergen Reactions    Codeine Nausea Only    Coumadin [Warfarin] Other (See Comments)     Could not stabalize    Fosamax [Alendronate] Nausea Only    Mercuric Bichloride Hives    Morphine Sulfate [Morphine] Nausea Only         Family History   Problem Relation Age of Onset    Diabetes Mother     Heart Disease Mother     Heart Disease Father     Cancer Sister     Cancer Brother          Social History     Socioeconomic History    Marital status:      Spouse name: Not on file    Number of children: Not on file    Years of education: Not on file    Highest education level: Not on file   Occupational History    Not on file   Tobacco Use    Smoking status: Former     Types: Cigarettes     Quit date: 1994     Years since quittin.6    Smokeless tobacco: Never   Vaping Use    Vaping Use: Never used   Substance and Sexual Activity    Alcohol use:  Yes     Alcohol/week: 1.0 standard drink     Types: 1 Glasses of wine per week     Comment: 1 glass occasionally    Drug use: No    Sexual activity: Yes     Partners: Male   Other Topics Concern    Not on file   Social History Narrative    Not on file     Social Determinants of Health     Financial Resource Strain: Not on file   Food Insecurity: Not on file   Transportation Needs: Not on file   Physical Activity: Not on file   Stress: Not on file   Social Connections: Not on file   Intimate Partner Violence: Not on file   Housing Stability: Not on file       There were no vitals filed for this visit. Wt Readings from Last 3 Encounters:   09/09/22 148 lb (67.1 kg)   02/16/22 153 lb 3.2 oz (69.5 kg)   07/08/21 153 lb (69.4 kg)         BP Readings from Last 3 Encounters:   02/16/22 118/68   07/08/21 120/70   01/07/21 126/80         Review of Systems   Constitutional:  Negative for appetite change, chills, diaphoresis, fatigue, fever and unexpected weight change. HENT:  Negative for congestion, dental problem, drooling, ear discharge, ear pain, facial swelling, hearing loss, mouth sores, nosebleeds, postnasal drip, sinus pressure, sore throat, tinnitus, trouble swallowing and voice change. Eyes:  Negative for photophobia, pain, discharge, redness, itching and visual disturbance. Respiratory:  Negative for apnea, cough, choking, chest tightness, shortness of breath and wheezing. Cardiovascular:  Negative for chest pain, palpitations, leg swelling and near-syncope. Gastrointestinal:  Negative for abdominal distention, abdominal pain, blood in stool, bowel incontinence, constipation, diarrhea, nausea and vomiting. Endocrine: Negative for cold intolerance, heat intolerance, polydipsia, polyphagia and polyuria. Genitourinary:  Negative for bladder incontinence. Musculoskeletal:  Positive for gait problem. Negative for arthralgias, back pain, joint swelling, myalgias, neck pain and neck stiffness. Skin:  Negative for color change, pallor, rash and wound. Allergic/Immunologic: Negative for environmental allergies, food allergies and immunocompromised state. Neurological:  Positive for weakness, numbness and loss of balance. Negative for dizziness, vertigo, tremors, focal weakness, seizures, syncope, facial asymmetry, speech difficulty, light-headedness and headaches. Hematological:  Negative for adenopathy. Does not bruise/bleed easily. Psychiatric/Behavioral:  Positive for decreased concentration and memory loss.  Negative for agitation, behavioral problems, confusion, dysphoric mood, hallucinations, self-injury, sleep disturbance and suicidal ideas. The patient is nervous/anxious. The patient is not hyperactive. OBJECTIVE:    Physical Exam  Constitutional:       Appearance: She is well-developed. HENT:      Head: Normocephalic and atraumatic. No raccoon eyes or Whitaker's sign. Right Ear: External ear normal.      Left Ear: External ear normal.      Nose: Nose normal.   Eyes:      Conjunctiva/sclera: Conjunctivae normal.   Neck:      Thyroid: No thyroid mass or thyromegaly. Vascular: No carotid bruit. Trachea: No tracheal deviation. Meningeal: Brudzinski's sign and Kernig's sign absent. Cardiovascular:      Rate and Rhythm: Normal rate and regular rhythm. Pulmonary:      Effort: Pulmonary effort is normal.   Musculoskeletal:         General: No tenderness. Cervical back: Normal range of motion and neck supple. No rigidity. No muscular tenderness. Normal range of motion. Skin:     General: Skin is warm. Coloration: Skin is not pale. Findings: No erythema or rash. Nails: There is no clubbing. Psychiatric:         Attention and Perception: She is inattentive. Mood and Affect: Mood is anxious and depressed. Affect is not labile, blunt, angry or inappropriate. Speech: Speech is delayed. Behavior: Behavior is slowed. Behavior is not agitated, aggressive, withdrawn, hyperactive or combative. Behavior is cooperative. Thought Content: Thought content is not paranoid or delusional. Thought content does not include homicidal or suicidal ideation. Thought content does not include homicidal or suicidal plan. Cognition and Memory: Cognition is impaired. Memory is impaired. She exhibits impaired recent memory and impaired remote memory. Judgment: Judgment is inappropriate. Judgment is not impulsive.          NEUROLOGICAL EXAMINATION :      A) MENTAL STATUS:                   Alert and  oriented  To Person. No Aphasia. No  Dysarthria. Able   To  Follow   SIMPLE    commands . No right  To left confusion. SLOW    Speech  And language function. Insight and  Judgment ,Fund  Of  Knowledge   DECREASED                Recent  And  Remote memory  DECREASED                Attention &Concentration are   DECREASED                                                  B) CRANIAL NERVES :             2 CN : Visual  Acuity  And  Visual fields  within normal limits                        Fundi  Could  Not  Be  Could  Not  Be  Evaluated. 3,4,6 CN : Both  Pupils are   Reactive and  Equal.                            Extraocular   Movements  Are  Intact. No  Nystagmus. No  ANTON. No  Afferent  Pupillary  Defect noted. 5 CN :  Normal  Facial sensations and Corneal  Reflexes           7 CN :  Normal  Facial  Symmetry  And  Strength. No facial  Weakness. 8 CN :  Hearing  Appears   DECREASED          9, 10 CN: Normal spontaneous, reflex palate movements         11 CN:   Normal  Shoulder shrug and  Strength         12 CN :   Normal  Tongue movements and  Tongue  In midline                        No tongue   Fasciculations or atrophy         C) MOTOR  EXAM:                 Strength  In upper  And  Lower extremities   within normal limits,                             EXCEPT    DECREASED   HAND                        Rapid alternating  And  repetitions  Movements  within normal limits               Muscle  Tone  In upper  And  Lower  Extremities  Normal                No rigidity. No  Spasticity. Bradykinesia   absent               No  Asterixis.               Sustention  Tremor , Resting  Tremor   absent                    No other  Abnormal  Movements noted           D) SENSORY :               light touch, pinprick, position And  Vibration  DECREASED      E) REFLEXES:                   Deep  Tendon  Reflexes   DECREASED                    No pathological  Reflexes  Bilaterally. F) COORDINATION  AND  GAIT :                                Station and  Gait   SLOW                                        Romberg's test   POSITIVE                          Ataxia negative          ASSESSMENT:      Patient Active Problem List   Diagnosis    Rheumatoid arthritis ([facility] [address]    Mixed hyperlipidemia    Mitral valve prolapse    Hypothyroidism    Family history of heart disease    Family history of clotting disorder    Essential hypertension    Diabetes mellitus ([facility] [address]    Arthritis    Late onset Alzheimer's disease without behavioral disturbance ([facility] [address]    Anxiety and depression    H/O falling    Confusion    Polyneuropathy associated with underlying disease ([facility] [address]    Mild closed head injury    Chronic cerebral ischemia    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 HISTORY:   ORDERING SYSTEM PROVIDED HISTORY: Cerebral infarction due to embolism of   other precerebral artery (HCC)       FINDINGS:       RIGHT:       The right common carotid artery demonstrates peak systolic velocities of 56,   49 cm/sec in the proximal and distal segments respectively. The right internal carotid artery demonstrates the systolic velocities of 46,   71, 67 cm/sec in the proximal, mid and distal segments respectively. The external carotid artery is patent. The vertebral artery demonstrates   normal antegrade flow. Scattered atherosclerosis. ICA/CCA ratio of 0.9. LEFT:       The left common carotid artery demonstrates peak systolic velocities of 66,   59 cm/sec in the proximal and distal segments respectively. The left internal carotid artery demonstrates the systolic velocities of 43,   87, 82 cm/sec in the proximal, mid and distal segments respectively. The external carotid artery is patent. The vertebral artery demonstrates   normal antegrade flow. Scattered atherosclerosis       ICA/CCA ratio of 0.9. Impression   The right internal carotid artery demonstrates 0-50% stenosis . The left internal carotid artery demonstrates 0-50% stenosis . Bilateral vertebral arteries are patent with flow in the normal direction. [address], [place]      Ordering physician: Lilliam Kwon    EXAMINATION: CT HEAD W/O CONTRAST  Date: 5/5/2018 7:37 PM  History: Female, 76years old. Ground level fall hit face and head, on blood thinners     COMPARISON:  None. Technique: Noncontrast imaging of the head. FINDINGS:  Intracranial: No mass effect or midline shift. No CT evidence of acute ischemia or infarction. No abnormal extra-axial collections. No acute intracranial hemorrhage. Negative for hydrocephalus. The basilar cisterns and foramen magnum are preserved. Atrophy/white matter: Age appropriate cerebral volume and white matter. Soft tissues and scalp: There is a right supraorbital scalp hematoma. It measures 44 mm in diameter by 12 mm in thickness. Visualized orbits: Unremarkable. Sinuses and mastoids: Clear sinuses. There has been previous right mastoidectomy. Left mastoid air cells are unremarkable. Bones: The bony calvarium is intact. Nondisplaced nasal fractures are seen. Additional comments: None. IMPRESSION:     1. No evidence of acute intracranial pathology. 2.  Nondisplaced nasal fractures  3. Right supraorbital scalp hematoma  4.   Status post right mastoidectomy      [address], [place]      Ordering physician: Lilliam Kwon    EXAMINATION: CT CERVICAL SPINE W/O CONTRAST    DATE OF EXAM: 5/5/2018 7:38 PM   CLINICAL INFORMATION: Ground level fall head Head    COMPARISON: None. TECHNIQUE: Multi-detector volumetric/helical data set was obtained through the cervical spine without contrast enhancement and viewed in bone windows. Additional sagittal and coronal reconstructions were also performed. FINDINGS:     Discs: Moderate multilevel spondylosis and degenerative facet arthropathy throughout with disc height loss most pronounced at C5-6 and C6-7. Alignment: The cervical vertebral bodies are in anatomic alignment. Prevertebral soft tissues: normal.     Bones: Odontoid is well seen and unremarkable. There is no evidence of fracture. Surrounding soft tissues: Normal with no evidence of mass, adenopathy, abnormal fluid collection    Lung apices: Clear. Visualized skull base: Right mastoidectomy changes and small right mastoid effusion. IMPRESSION:    1. Moderate multilevel spondylosis and degenerative facet arthropathy throughout with disc height loss most pronounced at C5-6 and C6-7.  2.  Right mastoidectomy changes and small right mastoid effusion. WSN:[id number]      Electronically Signed By:  Claire Ventura MD  Signed Date/Time:  5/5/2018 8:08:00 PM      VISITING DIAGNOSIS:      ICD-10-CM    1. Late onset Alzheimer's disease without behavioral disturbance (HCC)  G30.1     F02.80       2. Non compliance with medical treatment  Z91.19       3. Mitral valve prolapse  I34.1       4. Confusion  R41.0       5. H/O urinary incontinence  Z87.898       6. Polyneuropathy associated with underlying disease ([facility] [place] ([place]) [address]  G63       7. 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 Family history of clotting disorder  Z83.2       9. Arthritis  M19.90       10. Mild closed head injury, sequela  S09.90XS       11.  H/O falling  Z91.81                  CONCERNS   &   INCREASED   RISK   FOR         * TIA,  CEREBRO  VASCULAR DURATION  AND  FREQUENCY. *  AVOID    CONDITIONS  AND  FACTORS   THAT  MAKE                  NEUROLOGICAL  SYMPTOMS  WORSE. *  TO  MAINTAIN  REGULAR  SLEEP  WAKE  CYCLES. *   TO  HAVE  ADEQUATE  REST  AND   SLEEP    HOURS.          *    AVOID  ANY USAGE OF                   TOBACCO,  EXCESSIVE  ALCOHOL  AND   ILLEGAL   SUBSTANCES          *  CONTINUE MEDICATIONS PRESCRIBED BY NEUROLOGIST AS    RECOMMENDED     *   Compliance   With  Medications   And  Instructions          *  May   Use  Pill  Box,    If  Needed      *    Prophylactic  Use   Of     Vitamin   B   Complex,  Folic  Acid,    Vitamin  B12    Multivitamin,       Calcium  With  magnesium  And  Vit D    Supplementations   Over  The  Counter  Discussed           *  FOOT  CARE, DAILY  INSPECTION  OF  FEET   AND         PERIODIC  PODIATRY EVALUATIONS .           *  PATIENT  IS  ALSO   COUNSELED   TO  KEEP    ACTIVITIES:           A)   SIMPLE      B)  ORGANIZED      C)  WRITE   DOWN                          -   REVIEWED    WITH  PATIENT  AND  HER  SPOUSE.                      *  EVALUATIONS  AND  FOLLOW UP:    PERIODICALLY                 * PHYSICAL  THERAPY   / OCCUPATIONAL THERAPY                                * CARDIOLOGY              *  ORTHO                  * RHEUMATOLOGY                                                   *        RE  RECOMMENDED  :                                      A)    TOTAL   SUPERVISED     CARE                                     B)    HELP  WITH   ACTIVITIES OF  DAILY  LIVING                                            INCLUDING  PERSONAL   HYGIENE                                C)     FALL  PRECAUTIONS                                  D)     NURSING   HOME  PLACEMENT     TO      REDUCE                                               MORBIDITY     IN  VIEW  OF   HER  SEVERE  DEMENTIA                                E)     TO  CONTINUE  NAMENDA  XR   AND  ARICEPT   5   MG  PO  DAILY -    ABOVE  REVIEWED   AND  DISCUSSED   WITH  PATIENT                                              AND  HER     IN  DETAIL. *     VARIOUS  RISK   FACTORS   WERE  REVIEWED   AND   DISCUSSED. Orders Placed This Encounter   Medications    memantine ER (NAMENDA XR) 28 MG CP24 extended release capsule     Sig: Take 1 capsule by mouth daily ONE  CAPSULE  BY MOUTH   DAILY     Dispense:  90 capsule     Refill:  1    donepezil (ARICEPT) 5 MG tablet     Sig: TAKE 1 TABLET DAILY     Dispense:  30 tablet     Refill:  5               *PATIENT   TO  FOLLOW  UP  WITH   PRIMARY  CARE            AND   OTHER  CONSULTANTS  AS  BEFORE.             *TO  FOLLOW  WITH   MENTAL  HEALTH  PROFESSIONALS ,  INCLUDING            PSYCHOLOGICAL  COUNSELING   AND  PSYCHIATRIC  EVALUTIONS,                  *  Maintain   Healthy  Life Style    With   Periodic  Monitoring  Of      Any  Medical  Conditions  Including   Elevated  Blood  Pressure,  Lipid  Profile,     Blood  Sugar levels  And   Heart  Disease. *   Period   Screening  For  Cancers  Involving  Breast,  Colon,    lungs  And  Other  Organs  As  Applicable,  In consultation   With  Your  Primary Care Providers. * Second  Neurological  Opinion  And  Evaluations  In  [facility] AND [facility]  Setting  If  Patient  Is  Interested. * Please   Contact   Neurology  Clinic   Early   If   Are  Any  New  Neurological                             Symptoms   And  Any neurological  Concerns.                   *  If  The  Patient remains  Neurologically  Stable   Return   To  [facility] Neurology Department   IN  3- 6   MONTHS  TIME   FOR  FURTHER              FOLLOW UP.                     *  If   There is  Any  Significant  Worsening   Of  Current  Symptoms  And  Or  If patient  Develops       Any additional  New  Neurological Symptoms  Or  Significant  Concerns       Should  Call  911 or  Go  To  Emergency  Department  For  Further  Immediate  Evaluation. *   The  Neurological   Findings,  Possible  Diagnosis,  Differential diagnoses       And  Options  For    Further   Investigations   And  management   Are  Discussed  Comprehensively. Medications   And  Prescription   Risks  And  Side effects  Are   Also  Discussed. The  Above  Were  Reviewed  With  patient and   HER           questions  Answered  In  Detail. More   Than   50% of face  To face Time   Was  Spent  On  Counseling   And   Coordination  Of  Care       Of   Patient's multiple   Neurological  Problems   And   Comorbid  Medical   Conditions. Electronically signed by Pearline Halsted, MD.,  [facility]       Board Certified in  Neurology &  In  [employer] [employer] [address] of Psychiatry and Neurology ([employer])      DISCLAIMER:   Although every effort was made to ensure the accuracy of this  electronic transcription, some errors in transcription may have occurred. GENERAL PATIENT INSTRUCTIONS:     A Healthy Lifestyle: Care Instructions  Your Care Instructions  A healthy lifestyle can help you feel good, stay at a healthy weight, and have plenty of energy for both work and play. A healthy lifestyle is something you can share with your whole family. A healthy lifestyle also can lower your risk for serious health problems, such as high blood pressure, heart disease, and diabetes. You can follow a few steps listed below to improve your health and the health of your family. Follow-up care is a key part of your treatment and safety. Be sure to make and go to all appointments, and call your doctor if you are having problems. Its also a good idea to know your test results and keep a list of the medicines you take. How can you care for yourself at home?   Do not eat too much sugar, fat, or fast foods. You can still have dessert and treats now and then. The goal is moderation. Start small to improve your eating habits. Pay attention to portion sizes, drink less juice and soda pop, and eat more fruits and vegetables. Eat a healthy amount of food. A 3-ounce serving of meat, for example, is about the size of a deck of cards. Fill the rest of your plate with vegetables and whole grains. Limit the amount of soda and sports drinks you have every day. Drink more water when you are thirsty. Eat at least 5 servings of fruits and vegetables every day. It may seem like a lot, but it is not hard to reach this goal. A serving or helping is 1 piece of fruit, 1 cup of vegetables, or 2 cups of leafy, raw vegetables. Have an apple or some carrot sticks as an afternoon snack instead of a candy bar. Try to have fruits and/or vegetables at every meal.  Make exercise part of your daily routine. You may want to start with simple activities, such as walking, bicycling, or slow swimming. Try to be active 30 to 60 minutes every day. You do not need to do all 30 to 60 minutes all at once. For example, you can exercise 3 times a day for 10 or 20 minutes. Moderate exercise is safe for most people, but it is always a good idea to talk to your doctor before starting an exercise program.  Keep moving. Marck Dill the lawn, work in the garden, or [employer]. Take the stairs instead of the elevator at work. If you smoke, quit. People who smoke have an increased risk for heart attack, stroke, cancer, and other lung illnesses. Quitting is hard, but there are ways to boost your chance of quitting tobacco for good. Use nicotine gum, patches, or lozenges. Ask your doctor about stop-smoking programs and medicines. Keep trying.   In addition to reducing your risk of diseases in the future, you will notice some benefits soon after you stop using tobacco. If you have shortness of breath or asthma symptoms, they will likely get better within a few weeks after you quit. Limit how much alcohol you drink. Moderate amounts of alcohol (up to 2 drinks a day for men, 1 drink a day for women) are okay. But drinking too much can lead to liver problems, high blood pressure, and other health problems. Family health  If you have a family, there are many things you can do together to improve your health. Eat meals together as a family as often as possible. Eat healthy foods. This includes fruits, vegetables, lean meats and dairy, and whole grains. Include your family in your fitness plan. Most people think of activities such as jogging or tennis as the way to fitness, but there are many ways you and your family can be more active. Anything that makes you breathe hard and gets your heart pumping is exercise. Here are some tips:  Walk to do errands or to take your child to school or the bus. Go for a family bike ride after dinner instead of watching TV. Where can you learn more? Go to https://[employer].Voxel.pl. org and sign in to your [employer] account. Enter B255 in the Star[employer] box to learn more about \"A Healthy Lifestyle: Care Instructions. \"     If you do not have an account, please click on the \"Sign Up Now\" link. Current as of: July 26, 2016  Content Version: 11.2  © 1746-1936 [employer]. Care instructions adapted under license by [facility] [place] ([facility]). If you have questions about a medical condition or this instruction, always ask your healthcare professional. [place] [address] any warranty or liability for your use of this information.

## 2023-03-16 ENCOUNTER — TELEPHONE (OUTPATIENT)
Dept: NEUROLOGY | Age: 83
End: 2023-03-16
